# Patient Record
Sex: FEMALE | Race: WHITE | NOT HISPANIC OR LATINO | Employment: FULL TIME | URBAN - METROPOLITAN AREA
[De-identification: names, ages, dates, MRNs, and addresses within clinical notes are randomized per-mention and may not be internally consistent; named-entity substitution may affect disease eponyms.]

---

## 2017-01-27 ENCOUNTER — HOSPITAL ENCOUNTER (OUTPATIENT)
Dept: MAMMOGRAPHY | Facility: CLINIC | Age: 57
Discharge: HOME | End: 2017-01-27
Payer: COMMERCIAL

## 2017-01-27 ENCOUNTER — ALLSCRIPTS OFFICE VISIT (OUTPATIENT)
Dept: OTHER | Facility: OTHER | Age: 57
End: 2017-01-27

## 2017-01-27 ENCOUNTER — TRANSCRIBE ORDERS (OUTPATIENT)
Dept: MAMMOGRAPHY | Facility: CLINIC | Age: 57
End: 2017-01-27

## 2017-01-27 DIAGNOSIS — Z12.31 ENCOUNTER FOR SCREENING MAMMOGRAM FOR MALIGNANT NEOPLASM OF BREAST: ICD-10-CM

## 2017-01-27 PROCEDURE — G0145 SCR C/V CYTO,THINLAYER,RESCR: HCPCS | Performed by: PHYSICIAN ASSISTANT

## 2017-01-27 PROCEDURE — G0202 SCR MAMMO BI INCL CAD: HCPCS

## 2017-01-27 PROCEDURE — 87624 HPV HI-RISK TYP POOLED RSLT: CPT | Performed by: PHYSICIAN ASSISTANT

## 2017-01-28 ENCOUNTER — LAB REQUISITION (OUTPATIENT)
Dept: LAB | Facility: HOSPITAL | Age: 57
End: 2017-01-28
Payer: COMMERCIAL

## 2017-01-28 DIAGNOSIS — Z01.419 ENCOUNTER FOR GYNECOLOGICAL EXAMINATION WITHOUT ABNORMAL FINDING: ICD-10-CM

## 2017-01-31 LAB — HPV RRNA GENITAL QL NAA+PROBE: NORMAL

## 2017-02-01 LAB
LAB AP GYN PRIMARY INTERPRETATION: NORMAL
Lab: NORMAL

## 2017-07-18 DIAGNOSIS — E55.9 VITAMIN D DEFICIENCY: ICD-10-CM

## 2017-07-18 DIAGNOSIS — R73.9 HYPERGLYCEMIA: ICD-10-CM

## 2017-07-18 DIAGNOSIS — E03.9 HYPOTHYROIDISM: ICD-10-CM

## 2017-07-18 DIAGNOSIS — E78.1 PURE HYPERGLYCERIDEMIA: ICD-10-CM

## 2017-07-26 ENCOUNTER — APPOINTMENT (OUTPATIENT)
Dept: LAB | Facility: HOSPITAL | Age: 57
End: 2017-07-26
Payer: COMMERCIAL

## 2017-07-26 ENCOUNTER — TRANSCRIBE ORDERS (OUTPATIENT)
Dept: ADMINISTRATIVE | Facility: HOSPITAL | Age: 57
End: 2017-07-26

## 2017-07-26 DIAGNOSIS — E55.9 VITAMIN D DEFICIENCY: ICD-10-CM

## 2017-07-26 DIAGNOSIS — R73.9 HYPERGLYCEMIA: ICD-10-CM

## 2017-07-26 DIAGNOSIS — E03.9 HYPOTHYROIDISM: ICD-10-CM

## 2017-07-26 DIAGNOSIS — E78.1 PURE HYPERGLYCERIDEMIA: ICD-10-CM

## 2017-07-26 LAB
25(OH)D3 SERPL-MCNC: 41.3 NG/ML (ref 30–100)
ALBUMIN SERPL BCP-MCNC: 3.6 G/DL (ref 3.5–5)
ALP SERPL-CCNC: 56 U/L (ref 46–116)
ALT SERPL W P-5'-P-CCNC: 60 U/L (ref 12–78)
ANION GAP SERPL CALCULATED.3IONS-SCNC: 10 MMOL/L (ref 4–13)
AST SERPL W P-5'-P-CCNC: 22 U/L (ref 5–45)
BASOPHILS # BLD AUTO: 0.03 THOUSANDS/ΜL (ref 0–0.1)
BASOPHILS NFR BLD AUTO: 1 % (ref 0–1)
BILIRUB SERPL-MCNC: 0.5 MG/DL (ref 0.2–1)
BUN SERPL-MCNC: 19 MG/DL (ref 5–25)
CALCIUM SERPL-MCNC: 9.4 MG/DL (ref 8.3–10.1)
CHLORIDE SERPL-SCNC: 102 MMOL/L (ref 100–108)
CHOLEST SERPL-MCNC: 159 MG/DL (ref 50–200)
CO2 SERPL-SCNC: 27 MMOL/L (ref 21–32)
CREAT SERPL-MCNC: 0.98 MG/DL (ref 0.6–1.3)
EOSINOPHIL # BLD AUTO: 0.21 THOUSAND/ΜL (ref 0–0.61)
EOSINOPHIL NFR BLD AUTO: 3 % (ref 0–6)
ERYTHROCYTE [DISTWIDTH] IN BLOOD BY AUTOMATED COUNT: 13.8 % (ref 11.6–15.1)
EST. AVERAGE GLUCOSE BLD GHB EST-MCNC: 114 MG/DL
GFR SERPL CREATININE-BSD FRML MDRD: 64 ML/MIN/1.73SQ M
GLUCOSE P FAST SERPL-MCNC: 101 MG/DL (ref 65–99)
HBA1C MFR BLD: 5.6 % (ref 4.2–6.3)
HCT VFR BLD AUTO: 40.8 % (ref 34.8–46.1)
HDLC SERPL-MCNC: 31 MG/DL (ref 40–60)
HGB BLD-MCNC: 13.8 G/DL (ref 11.5–15.4)
LDLC SERPL CALC-MCNC: 98 MG/DL (ref 0–100)
LYMPHOCYTES # BLD AUTO: 1.86 THOUSANDS/ΜL (ref 0.6–4.47)
LYMPHOCYTES NFR BLD AUTO: 29 % (ref 14–44)
MCH RBC QN AUTO: 29.4 PG (ref 26.8–34.3)
MCHC RBC AUTO-ENTMCNC: 33.8 G/DL (ref 31.4–37.4)
MCV RBC AUTO: 87 FL (ref 82–98)
MONOCYTES # BLD AUTO: 0.57 THOUSAND/ΜL (ref 0.17–1.22)
MONOCYTES NFR BLD AUTO: 9 % (ref 4–12)
NEUTROPHILS # BLD AUTO: 3.82 THOUSANDS/ΜL (ref 1.85–7.62)
NEUTS SEG NFR BLD AUTO: 58 % (ref 43–75)
PLATELET # BLD AUTO: 229 THOUSANDS/UL (ref 149–390)
PMV BLD AUTO: 9.5 FL (ref 8.9–12.7)
POTASSIUM SERPL-SCNC: 3.8 MMOL/L (ref 3.5–5.3)
PROT SERPL-MCNC: 6.9 G/DL (ref 6.4–8.2)
RBC # BLD AUTO: 4.7 MILLION/UL (ref 3.81–5.12)
SODIUM SERPL-SCNC: 139 MMOL/L (ref 136–145)
TRIGL SERPL-MCNC: 150 MG/DL
TSH SERPL DL<=0.05 MIU/L-ACNC: 2.63 UIU/ML (ref 0.36–3.74)
WBC # BLD AUTO: 6.49 THOUSAND/UL (ref 4.31–10.16)

## 2017-07-26 PROCEDURE — 80053 COMPREHEN METABOLIC PANEL: CPT

## 2017-07-26 PROCEDURE — 36415 COLL VENOUS BLD VENIPUNCTURE: CPT

## 2017-07-26 PROCEDURE — 82306 VITAMIN D 25 HYDROXY: CPT

## 2017-07-26 PROCEDURE — 80061 LIPID PANEL: CPT

## 2017-07-26 PROCEDURE — 84443 ASSAY THYROID STIM HORMONE: CPT

## 2017-07-26 PROCEDURE — 85025 COMPLETE CBC W/AUTO DIFF WBC: CPT

## 2017-07-26 PROCEDURE — 83036 HEMOGLOBIN GLYCOSYLATED A1C: CPT

## 2017-07-28 ENCOUNTER — ALLSCRIPTS OFFICE VISIT (OUTPATIENT)
Dept: OTHER | Facility: OTHER | Age: 57
End: 2017-07-28

## 2018-01-13 VITALS
WEIGHT: 195.5 LBS | HEART RATE: 64 BPM | SYSTOLIC BLOOD PRESSURE: 130 MMHG | RESPIRATION RATE: 14 BRPM | HEIGHT: 67 IN | DIASTOLIC BLOOD PRESSURE: 90 MMHG | BODY MASS INDEX: 30.69 KG/M2

## 2018-01-14 VITALS
WEIGHT: 192 LBS | BODY MASS INDEX: 30.13 KG/M2 | SYSTOLIC BLOOD PRESSURE: 124 MMHG | HEART RATE: 64 BPM | RESPIRATION RATE: 14 BRPM | HEIGHT: 67 IN | DIASTOLIC BLOOD PRESSURE: 78 MMHG

## 2018-05-22 ENCOUNTER — TRANSCRIBE ORDERS (OUTPATIENT)
Dept: ADMINISTRATIVE | Facility: HOSPITAL | Age: 58
End: 2018-05-22

## 2018-05-22 DIAGNOSIS — Z12.39 SCREENING BREAST EXAMINATION: Primary | ICD-10-CM

## 2018-05-25 ENCOUNTER — HOSPITAL ENCOUNTER (OUTPATIENT)
Dept: BONE DENSITY | Facility: IMAGING CENTER | Age: 58
Discharge: HOME | End: 2018-05-25
Payer: COMMERCIAL

## 2018-05-25 DIAGNOSIS — Z12.39 SCREENING BREAST EXAMINATION: ICD-10-CM

## 2018-05-25 PROCEDURE — 77067 SCR MAMMO BI INCL CAD: CPT

## 2018-07-16 ENCOUNTER — TELEPHONE (OUTPATIENT)
Dept: FAMILY MEDICINE CLINIC | Facility: CLINIC | Age: 58
End: 2018-07-16

## 2018-07-16 DIAGNOSIS — R73.9 HYPERGLYCEMIA: ICD-10-CM

## 2018-07-16 DIAGNOSIS — E78.1 ESSENTIAL HYPERTRIGLYCERIDEMIA: ICD-10-CM

## 2018-07-16 DIAGNOSIS — E03.9 HYPOTHYROIDISM, UNSPECIFIED TYPE: Primary | ICD-10-CM

## 2018-07-16 DIAGNOSIS — E55.9 VITAMIN D DEFICIENCY: ICD-10-CM

## 2018-07-16 RX ORDER — LEVOTHYROXINE SODIUM 0.07 MG/1
75 TABLET ORAL
Qty: 30 TABLET | Refills: 0 | Status: SHIPPED | OUTPATIENT
Start: 2018-07-16 | End: 2018-08-17 | Stop reason: CLARIF

## 2018-07-16 RX ORDER — LEVOTHYROXINE SODIUM 0.07 MG/1
TABLET ORAL
Refills: 0 | COMMUNITY
Start: 2018-04-16 | End: 2018-07-16 | Stop reason: SDUPTHER

## 2018-07-16 NOTE — TELEPHONE ENCOUNTER
I am sending 30 day refill for her thyroid medication  Please send this message to Samuel Bynum so she can enter her lab orders Elizabeth Floyd is checking her messages today)

## 2018-07-16 NOTE — TELEPHONE ENCOUNTER
Patient made an appointment to see Rm Beal but she needs a lab slip to have her tsh checked and she will need more pills sent to the [facility] until she can get this done

## 2018-07-19 ENCOUNTER — TELEPHONE (OUTPATIENT)
Dept: FAMILY MEDICINE CLINIC | Facility: CLINIC | Age: 58
End: 2018-07-19

## 2018-07-19 DIAGNOSIS — E78.5 HYPERLIPIDEMIA, UNSPECIFIED HYPERLIPIDEMIA TYPE: Primary | ICD-10-CM

## 2018-07-19 RX ORDER — FENOFIBRATE 145 MG/1
1 TABLET, COATED ORAL DAILY
COMMUNITY
Start: 2017-08-07 | End: 2018-07-19 | Stop reason: SDUPTHER

## 2018-07-19 RX ORDER — FENOFIBRATE 145 MG/1
145 TABLET, COATED ORAL DAILY
Qty: 30 TABLET | Refills: 0 | Status: SHIPPED | OUTPATIENT
Start: 2018-07-19 | End: 2018-08-17 | Stop reason: SDUPTHER

## 2018-07-19 NOTE — TELEPHONE ENCOUNTER
Pt pharm LM on the Rx line requesting a refill on the pt's Fenofibrate 145 MG      Pharm call back number: [phone]

## 2018-07-25 RX ORDER — LANOLIN ALCOHOL/MO/W.PET/CERES
1 CREAM (GRAM) TOPICAL DAILY
COMMUNITY

## 2018-07-25 RX ORDER — EPINEPHRINE 0.3 MG/.3ML
INJECTION SUBCUTANEOUS
COMMUNITY
Start: 2012-08-01

## 2018-07-25 RX ORDER — CHLORAL HYDRATE 500 MG
1 CAPSULE ORAL DAILY
COMMUNITY

## 2018-07-26 ENCOUNTER — OFFICE VISIT (OUTPATIENT)
Dept: FAMILY MEDICINE CLINIC | Facility: CLINIC | Age: 58
End: 2018-07-26
Payer: COMMERCIAL

## 2018-07-26 VITALS
BODY MASS INDEX: 30.67 KG/M2 | DIASTOLIC BLOOD PRESSURE: 81 MMHG | SYSTOLIC BLOOD PRESSURE: 122 MMHG | RESPIRATION RATE: 14 BRPM | HEIGHT: 67 IN | HEART RATE: 77 BPM | WEIGHT: 195.4 LBS

## 2018-07-26 DIAGNOSIS — L82.1 SEBORRHEIC KERATOSIS: Primary | ICD-10-CM

## 2018-07-26 DIAGNOSIS — E03.9 HYPOTHYROIDISM, UNSPECIFIED TYPE: ICD-10-CM

## 2018-07-26 DIAGNOSIS — E78.1 ESSENTIAL HYPERTRIGLYCERIDEMIA: ICD-10-CM

## 2018-07-26 PROCEDURE — 99213 OFFICE O/P EST LOW 20 MIN: CPT | Performed by: PHYSICIAN ASSISTANT

## 2018-07-26 NOTE — PROGRESS NOTES
Assessment/Plan:    1  Seborrheic keratosis    - reassurance, schedule for removal here    2  Hypothyroidism, unspecified type    - c/w levothyroxine 75 mcg once daily  - get labs done and will review at appt for skin procedure    3  Essential hypertriglyceridemia    - controlled with Tricor 145 mg qd and diet and exercise  - get labs done and we will discuss at follow up    Colon - FOBT cards given  mammo - due 5/2019  PAP due 1/2019    F/u for skin procedure  F/u as needed    Subjective:   Chief Complaint   Patient presents with    check on moles      Patient ID: Yumiko Farley is a 60 y o  female  Patient here to have two moles reviewed  Both new in the past year, itchy, brown, raised  One on neck and one of back  "get in the way"  Worries as she use to use oil on her skin instead of sun block  Aware she is due for labs  The following portions of the patient's history were reviewed and updated as appropriate: allergies, current medications, past family history, past medical history, past social history, past surgical history and problem list     Past Medical History:   Diagnosis Date    Abnormal liver function test     Last Assessed: 7/26/2012    Diffuse nontoxic goiter     Elevated blood pressure reading     Last Assessed: 1/29/2013    Hematuria     Nonspecific abnormal results of function study of thyroid     Lsat Assessed: 7/26/2012    Seborrhea     Seborrheic keratosis     Last Assessed: 2/6/2013     History reviewed  No pertinent surgical history  Family History   Problem Relation Age of Onset    No Known Problems Mother     Hypertension Father     Diabetes type II Father     Alcohol abuse Neg Hx     Substance Abuse Neg Hx     Mental illness Neg Hx      Social History     Social History    Marital status: /Civil Union     Spouse name: N/A    Number of children: N/A    Years of education: N/A     Occupational History    Not on file       Social History Main Topics    Smoking status: Current Some Day Smoker    Smokeless tobacco: Never Used      Comment: 1/4 of a pack a day    Alcohol use Yes      Comment: social - 1 glass of wine a month    Drug use: No    Sexual activity: Not on file     Other Topics Concern    Not on file     Social History Narrative    Uses safety equipment - seatbelts           Current Outpatient Prescriptions:     Ascorbic Acid (VITAMIN C) 500 MG/5ML LIQD, Take 1 tablet by mouth daily, Disp: , Rfl:     calcium citrate-vitamin D (CITRACAL+D) 315-200 MG-UNIT per tablet, Take 1 tablet by mouth daily, Disp: , Rfl:     Cholecalciferol (VITAMIN D3) 1000 UNIT/SPRAY LIQD, Take 1 capsule by mouth daily, Disp: , Rfl:     EPINEPHrine (EPIPEN 2-MARCIE) 0 3 mg/0 3 mL SOAJ, Inject as directed, Disp: , Rfl:     fenofibrate (TRICOR) 145 mg tablet, Take 1 tablet (145 mg total) by mouth daily, Disp: 30 tablet, Rfl: 0    levothyroxine 75 mcg tablet, Take 1 tablet (75 mcg total) by mouth daily before breakfast, Disp: 30 tablet, Rfl: 0    Multiple Vitamins-Minerals (MULTI-VITAMIN/MINERALS PO), Take 1 tablet by mouth daily, Disp: , Rfl:     Omega-3 Fatty Acids (OMEGA-3 FISH OIL) 1000 MG CAPS, Take 1 capsule by mouth daily, Disp: , Rfl:     vitamin E 400 UNIT/15ML LIQD, Take 1 capsule by mouth daily, Disp: , Rfl:     Review of Systems   Constitutional: Negative  HENT: Negative  Eyes: Negative  Respiratory: Negative  Cardiovascular: Negative  Gastrointestinal: Negative  Endocrine: Negative  Genitourinary: Negative  Musculoskeletal: Negative  Allergic/Immunologic: Negative  Neurological: Negative  Hematological: Negative  Psychiatric/Behavioral: Negative                Objective:    Vitals:    07/26/18 1006   BP: 122/81   BP Location: Right arm   Patient Position: Sitting   Cuff Size: Standard   Pulse: 77   Resp: 14   Weight: 88 6 kg (195 lb 6 4 oz)   Height: 5' 6 75" (1 695 m)        Physical Exam   Constitutional: She is oriented to person, place, and time  She appears well-developed and well-nourished  Cardiovascular: Normal rate, regular rhythm and normal heart sounds  Pulmonary/Chest: Effort normal and breath sounds normal    Neurological: She is alert and oriented to person, place, and time  Skin: Skin is warm  Left side neck 4 mm papular, brown, uniform leathery lesion    Middle of back at bra line 8 mm x 5 mm brown papular uniform leathery lesion   Psychiatric: She has a normal mood and affect

## 2018-07-30 ENCOUNTER — APPOINTMENT (OUTPATIENT)
Dept: LAB | Facility: HOSPITAL | Age: 58
End: 2018-07-30
Payer: COMMERCIAL

## 2018-07-30 DIAGNOSIS — E55.9 VITAMIN D DEFICIENCY: ICD-10-CM

## 2018-07-30 DIAGNOSIS — R73.9 HYPERGLYCEMIA: ICD-10-CM

## 2018-07-30 DIAGNOSIS — E03.9 HYPOTHYROIDISM, UNSPECIFIED TYPE: ICD-10-CM

## 2018-07-30 DIAGNOSIS — E78.1 ESSENTIAL HYPERTRIGLYCERIDEMIA: ICD-10-CM

## 2018-07-30 LAB
25(OH)D3 SERPL-MCNC: 27.4 NG/ML (ref 30–100)
ALBUMIN SERPL BCP-MCNC: 3.8 G/DL (ref 3.5–5)
ALP SERPL-CCNC: 56 U/L (ref 46–116)
ALT SERPL W P-5'-P-CCNC: 64 U/L (ref 12–78)
ANION GAP SERPL CALCULATED.3IONS-SCNC: 4 MMOL/L (ref 4–13)
AST SERPL W P-5'-P-CCNC: 33 U/L (ref 5–45)
BACTERIA UR QL AUTO: ABNORMAL /HPF
BASOPHILS # BLD AUTO: 0.07 THOUSANDS/ΜL (ref 0–0.1)
BASOPHILS NFR BLD AUTO: 1 % (ref 0–1)
BILIRUB SERPL-MCNC: 0.4 MG/DL (ref 0.2–1)
BILIRUB UR QL STRIP: NEGATIVE
BUN SERPL-MCNC: 22 MG/DL (ref 5–25)
CALCIUM SERPL-MCNC: 9.3 MG/DL (ref 8.3–10.1)
CHLORIDE SERPL-SCNC: 103 MMOL/L (ref 100–108)
CLARITY UR: CLEAR
CO2 SERPL-SCNC: 31 MMOL/L (ref 21–32)
COLOR UR: YELLOW
CREAT SERPL-MCNC: 1.06 MG/DL (ref 0.6–1.3)
EOSINOPHIL # BLD AUTO: 0.22 THOUSAND/ΜL (ref 0–0.61)
EOSINOPHIL NFR BLD AUTO: 3 % (ref 0–6)
ERYTHROCYTE [DISTWIDTH] IN BLOOD BY AUTOMATED COUNT: 12.6 % (ref 11.6–15.1)
EST. AVERAGE GLUCOSE BLD GHB EST-MCNC: 117 MG/DL
GFR SERPL CREATININE-BSD FRML MDRD: 58 ML/MIN/1.73SQ M
GLUCOSE P FAST SERPL-MCNC: 109 MG/DL (ref 65–99)
GLUCOSE UR STRIP-MCNC: NEGATIVE MG/DL
HBA1C MFR BLD: 5.7 % (ref 4.2–6.3)
HCT VFR BLD AUTO: 40.9 % (ref 34.8–46.1)
HGB BLD-MCNC: 13.9 G/DL (ref 11.5–15.4)
HGB UR QL STRIP.AUTO: NEGATIVE
IMM GRANULOCYTES # BLD AUTO: 0.04 THOUSAND/UL (ref 0–0.2)
IMM GRANULOCYTES NFR BLD AUTO: 1 % (ref 0–2)
KETONES UR STRIP-MCNC: NEGATIVE MG/DL
LDLC SERPL DIRECT ASSAY-MCNC: 131 MG/DL (ref 0–100)
LEUKOCYTE ESTERASE UR QL STRIP: ABNORMAL
LYMPHOCYTES # BLD AUTO: 2.07 THOUSANDS/ΜL (ref 0.6–4.47)
LYMPHOCYTES NFR BLD AUTO: 32 % (ref 14–44)
MCH RBC QN AUTO: 30.2 PG (ref 26.8–34.3)
MCHC RBC AUTO-ENTMCNC: 34 G/DL (ref 31.4–37.4)
MCV RBC AUTO: 89 FL (ref 82–98)
MONOCYTES # BLD AUTO: 0.54 THOUSAND/ΜL (ref 0.17–1.22)
MONOCYTES NFR BLD AUTO: 8 % (ref 4–12)
NEUTROPHILS # BLD AUTO: 3.6 THOUSANDS/ΜL (ref 1.85–7.62)
NEUTS SEG NFR BLD AUTO: 55 % (ref 43–75)
NITRITE UR QL STRIP: NEGATIVE
NON-SQ EPI CELLS URNS QL MICRO: ABNORMAL /HPF
NRBC BLD AUTO-RTO: 0 /100 WBCS
OTHER STN SPEC: ABNORMAL
PH UR STRIP.AUTO: 6 [PH] (ref 4.5–8)
PLATELET # BLD AUTO: 229 THOUSANDS/UL (ref 149–390)
PMV BLD AUTO: 9.4 FL (ref 8.9–12.7)
POTASSIUM SERPL-SCNC: 4 MMOL/L (ref 3.5–5.3)
PROT SERPL-MCNC: 7 G/DL (ref 6.4–8.2)
PROT UR STRIP-MCNC: NEGATIVE MG/DL
RBC # BLD AUTO: 4.6 MILLION/UL (ref 3.81–5.12)
RBC #/AREA URNS AUTO: ABNORMAL /HPF
SODIUM SERPL-SCNC: 138 MMOL/L (ref 136–145)
SP GR UR STRIP.AUTO: 1.01 (ref 1–1.03)
TSH SERPL DL<=0.05 MIU/L-ACNC: 3.52 UIU/ML (ref 0.36–3.74)
UROBILINOGEN UR QL STRIP.AUTO: 0.2 E.U./DL
WBC # BLD AUTO: 6.54 THOUSAND/UL (ref 4.31–10.16)
WBC #/AREA URNS AUTO: ABNORMAL /HPF

## 2018-07-30 PROCEDURE — 81001 URINALYSIS AUTO W/SCOPE: CPT | Performed by: PHYSICIAN ASSISTANT

## 2018-07-30 PROCEDURE — 83036 HEMOGLOBIN GLYCOSYLATED A1C: CPT

## 2018-07-30 PROCEDURE — 83721 ASSAY OF BLOOD LIPOPROTEIN: CPT

## 2018-07-30 PROCEDURE — 84443 ASSAY THYROID STIM HORMONE: CPT

## 2018-07-30 PROCEDURE — 36415 COLL VENOUS BLD VENIPUNCTURE: CPT

## 2018-07-30 PROCEDURE — 82306 VITAMIN D 25 HYDROXY: CPT

## 2018-07-30 PROCEDURE — 85025 COMPLETE CBC W/AUTO DIFF WBC: CPT

## 2018-07-30 PROCEDURE — 80053 COMPREHEN METABOLIC PANEL: CPT

## 2018-08-17 ENCOUNTER — OFFICE VISIT (OUTPATIENT)
Dept: FAMILY MEDICINE CLINIC | Facility: CLINIC | Age: 58
End: 2018-08-17
Payer: COMMERCIAL

## 2018-08-17 VITALS
HEART RATE: 75 BPM | BODY MASS INDEX: 30.51 KG/M2 | RESPIRATION RATE: 16 BRPM | DIASTOLIC BLOOD PRESSURE: 85 MMHG | HEIGHT: 67 IN | SYSTOLIC BLOOD PRESSURE: 130 MMHG | WEIGHT: 194.4 LBS

## 2018-08-17 DIAGNOSIS — R73.9 HYPERGLYCEMIA: ICD-10-CM

## 2018-08-17 DIAGNOSIS — E03.9 HYPOTHYROIDISM, UNSPECIFIED TYPE: ICD-10-CM

## 2018-08-17 DIAGNOSIS — L98.9 SKIN LESION OF BACK: Primary | ICD-10-CM

## 2018-08-17 DIAGNOSIS — E78.5 HYPERLIPIDEMIA, UNSPECIFIED HYPERLIPIDEMIA TYPE: ICD-10-CM

## 2018-08-17 DIAGNOSIS — E78.1 ESSENTIAL HYPERTRIGLYCERIDEMIA: ICD-10-CM

## 2018-08-17 DIAGNOSIS — E55.9 VITAMIN D DEFICIENCY: ICD-10-CM

## 2018-08-17 PROCEDURE — 88305 TISSUE EXAM BY PATHOLOGIST: CPT | Performed by: PATHOLOGY

## 2018-08-17 PROCEDURE — 11301 SHAVE SKIN LESION 0.6-1.0 CM: CPT | Performed by: PHYSICIAN ASSISTANT

## 2018-08-17 PROCEDURE — 99214 OFFICE O/P EST MOD 30 MIN: CPT | Performed by: PHYSICIAN ASSISTANT

## 2018-08-17 RX ORDER — FENOFIBRATE 145 MG/1
145 TABLET, COATED ORAL DAILY
Qty: 30 TABLET | Refills: 0 | Status: SHIPPED | OUTPATIENT
Start: 2018-08-17 | End: 2018-08-17 | Stop reason: SDUPTHER

## 2018-08-17 RX ORDER — FENOFIBRATE 145 MG/1
145 TABLET, COATED ORAL DAILY
Qty: 90 TABLET | Refills: 3 | Status: SHIPPED | OUTPATIENT
Start: 2018-08-17 | End: 2018-10-26 | Stop reason: SDUPTHER

## 2018-08-17 RX ORDER — LEVOTHYROXINE SODIUM 75 MCG
75 TABLET ORAL DAILY
Qty: 90 TABLET | Refills: 3 | Status: SHIPPED | OUTPATIENT
Start: 2018-08-17 | End: 2019-02-05 | Stop reason: SDUPTHER

## 2018-08-17 NOTE — PROGRESS NOTES
Assessment/Plan:    1  Hypothyroidism, unspecified type    - continue as is with Synthroid 75 mcg, will switch from levothyroxine to synthroid due to recall  Recheck TSh in 6 months  - SYNTHROID 75 MCG tablet; Take 1 tablet (75 mcg total) by mouth daily  Dispense: 90 tablet; Refill: 3    2  Hyperlipidemia, unspecified hyperlipidemia type    - Obtain triglycerides, only LDL run  C/w diet, exercise, Tricor daily  - fenofibrate (TRICOR) 145 mg tablet; Take 1 tablet (145 mg total) by mouth daily  Dispense: 90 tablet; Refill: 3    3  Hyperglycemia    - A1C 5 7% continue with diet and exercise, repeat 1 year    4  Vitamin d    - patient taking 1,000 IU level is 27, increase to 2,000 IU    5  Skin lesion of back    - most likely benign in nature, lesion removed sent to pathology  - Tissue Exam    mammo due 5/2019  Pap due 5/2019  Colon has stool cards    F/u 6 months or sooner if needed    Subjective:   Chief Complaint   Patient presents with    Nevus      Patient ID: Roderick Winters is a 60 y o  female  Patient here to review labs and have a mole on back removed  Mole is at bra line on back, hard to see, cannot tell if it is changing and gets in the way  The following portions of the patient's history were reviewed and updated as appropriate: allergies, current medications, past family history, past medical history, past social history, past surgical history and problem list     Past Medical History:   Diagnosis Date    Abnormal liver function test     Last Assessed: 7/26/2012    Diffuse nontoxic goiter     Elevated blood pressure reading     Last Assessed: 1/29/2013    Hematuria     Nonspecific abnormal results of function study of thyroid     Lsat Assessed: 7/26/2012    Seborrhea     Seborrheic keratosis     Last Assessed: 2/6/2013     History reviewed  No pertinent surgical history    Family History   Problem Relation Age of Onset    No Known Problems Mother     Hypertension Father     Diabetes type II Father     Alcohol abuse Neg Hx     Substance Abuse Neg Hx     Mental illness Neg Hx      Social History     Social History    Marital status: /Civil Union     Spouse name: N/A    Number of children: N/A    Years of education: N/A     Occupational History    Not on file  Social History Main Topics    Smoking status: Current Some Day Smoker    Smokeless tobacco: Never Used      Comment: 1/4 of a pack a day    Alcohol use Yes      Comment: social - 1 glass of wine a month    Drug use: No    Sexual activity: Not on file     Other Topics Concern    Not on file     Social History Narrative    Uses safety equipment - seatbelts           Current Outpatient Prescriptions:     Ascorbic Acid (VITAMIN C) 500 MG/5ML LIQD, Take 1 tablet by mouth daily, Disp: , Rfl:     calcium citrate-vitamin D (CITRACAL+D) 315-200 MG-UNIT per tablet, Take 1 tablet by mouth daily, Disp: , Rfl:     Cholecalciferol (VITAMIN D3) 1000 UNIT/SPRAY LIQD, Take 1 capsule by mouth daily, Disp: , Rfl:     EPINEPHrine (EPIPEN 2-MARCIE) 0 3 mg/0 3 mL SOAJ, Inject as directed, Disp: , Rfl:     fenofibrate (TRICOR) 145 mg tablet, Take 1 tablet (145 mg total) by mouth daily, Disp: 90 tablet, Rfl: 3    Multiple Vitamins-Minerals (MULTI-VITAMIN/MINERALS PO), Take 1 tablet by mouth daily, Disp: , Rfl:     Omega-3 Fatty Acids (OMEGA-3 FISH OIL) 1000 MG CAPS, Take 1 capsule by mouth daily, Disp: , Rfl:     vitamin E 400 UNIT/15ML LIQD, Take 1 capsule by mouth daily, Disp: , Rfl:     Review of Systems          Objective:    Vitals:    08/17/18 0801   BP: 130/85   BP Location: Right arm   Patient Position: Sitting   Cuff Size: Standard   Pulse: 75   Resp: 16   Weight: 88 2 kg (194 lb 6 4 oz)   Height: 5' 7" (1 702 m)        Physical Exam   Constitutional: She is oriented to person, place, and time  She appears well-developed and well-nourished  Cardiovascular: Normal rate, regular rhythm and normal heart sounds      Pulmonary/Chest: Effort normal and breath sounds normal    Neurological: She is alert and oriented to person, place, and time  Skin: Skin is warm  Mid thoracic centrally located brown, velvety papular lesion 8 mm x 4 mm   Psychiatric: She has a normal mood and affect  Judgment normal        Appointment on 07/30/2018   Component Date Value Ref Range Status    LDL Direct 07/30/2018 131* 0 - 100 mg/dl Final    Sodium 07/30/2018 138  136 - 145 mmol/L Final    Potassium 07/30/2018 4 0  3 5 - 5 3 mmol/L Final    Chloride 07/30/2018 103  100 - 108 mmol/L Final    CO2 07/30/2018 31  21 - 32 mmol/L Final    Anion Gap 07/30/2018 4  4 - 13 mmol/L Final    BUN 07/30/2018 22  5 - 25 mg/dL Final    Creatinine 07/30/2018 1 06  0 60 - 1 30 mg/dL Final    Standardized to IDMS reference method    Glucose, Fasting 07/30/2018 109* 65 - 99 mg/dL Final      Specimen collection should occur prior to Sulfasalazine administration due to the potential for falsely depressed results  Specimen collection should occur prior to Sulfapyridine administration due to the potential for falsely elevated results   Calcium 07/30/2018 9 3  8 3 - 10 1 mg/dL Final    AST 07/30/2018 33  5 - 45 U/L Final      Specimen collection should occur prior to Sulfasalazine administration due to the potential for falsely depressed results   ALT 07/30/2018 64  12 - 78 U/L Final      Specimen collection should occur prior to Sulfasalazine administration due to the potential for falsely depressed results       Alkaline Phosphatase 07/30/2018 56  46 - 116 U/L Final    Total Protein 07/30/2018 7 0  6 4 - 8 2 g/dL Final    Albumin 07/30/2018 3 8  3 5 - 5 0 g/dL Final    Total Bilirubin 07/30/2018 0 40  0 20 - 1 00 mg/dL Final    eGFR 07/30/2018 58  ml/min/1 73sq m Final    WBC 07/30/2018 6 54  4 31 - 10 16 Thousand/uL Final    RBC 07/30/2018 4 60  3 81 - 5 12 Million/uL Final    Hemoglobin 07/30/2018 13 9  11 5 - 15 4 g/dL Final    Hematocrit 07/30/2018 40 9 34 8 - 46 1 % Final    MCV 07/30/2018 89  82 - 98 fL Final    MCH 07/30/2018 30 2  26 8 - 34 3 pg Final    MCHC 07/30/2018 34 0  31 4 - 37 4 g/dL Final    RDW 07/30/2018 12 6  11 6 - 15 1 % Final    MPV 07/30/2018 9 4  8 9 - 12 7 fL Final    Platelets 01/05/7558 229  149 - 390 Thousands/uL Final    nRBC 07/30/2018 0  /100 WBCs Final    Neutrophils Relative 07/30/2018 55  43 - 75 % Final    Immat GRANS % 07/30/2018 1  0 - 2 % Final    Lymphocytes Relative 07/30/2018 32  14 - 44 % Final    Monocytes Relative 07/30/2018 8  4 - 12 % Final    Eosinophils Relative 07/30/2018 3  0 - 6 % Final    Basophils Relative 07/30/2018 1  0 - 1 % Final    Neutrophils Absolute 07/30/2018 3 60  1 85 - 7 62 Thousands/µL Final    Immature Grans Absolute 07/30/2018 0 04  0 00 - 0 20 Thousand/uL Final    Lymphocytes Absolute 07/30/2018 2 07  0 60 - 4 47 Thousands/µL Final    Monocytes Absolute 07/30/2018 0 54  0 17 - 1 22 Thousand/µL Final    Eosinophils Absolute 07/30/2018 0 22  0 00 - 0 61 Thousand/µL Final    Basophils Absolute 07/30/2018 0 07  0 00 - 0 10 Thousands/µL Final    TSH 3RD GENERATON 07/30/2018 3 523  0 358 - 3 740 uIU/mL Final    Vit D, 25-Hydroxy 07/30/2018 27 4* 30 0 - 100 0 ng/mL Final    Hemoglobin A1C 07/30/2018 5 7  4 2 - 6 3 % Final    EAG 07/30/2018 117  mg/dl Final   ]  Shave lesion  Date/Time: 8/17/2018 9:18 AM  Performed by: Lenna Bumpers  Authorized by: Lenna Bumpers     Number of Lesions: 1  Lesion 1:     Body area: trunk    Initial size (mm): 8    Final defect size (mm): 10    Malignancy: malignancy unknown      Destruction method: shave removal      Comments:  Consent obtained, area cleaned with rubbing alcohol and injected with 1 cc of lidocaine with epi  Lesion shaved, bleeders cauterized and bandaged applied  Keep lesion clean and dry for 24 hours  Lesion sent for pathology

## 2018-08-20 ENCOUNTER — OFFICE VISIT (OUTPATIENT)
Dept: FAMILY MEDICINE CLINIC | Facility: CLINIC | Age: 58
End: 2018-08-20
Payer: COMMERCIAL

## 2018-08-20 VITALS
WEIGHT: 196 LBS | RESPIRATION RATE: 12 BRPM | SYSTOLIC BLOOD PRESSURE: 122 MMHG | HEART RATE: 84 BPM | BODY MASS INDEX: 30.76 KG/M2 | DIASTOLIC BLOOD PRESSURE: 84 MMHG | HEIGHT: 67 IN

## 2018-08-20 DIAGNOSIS — H60.501 ACUTE OTITIS EXTERNA OF RIGHT EAR, UNSPECIFIED TYPE: Primary | ICD-10-CM

## 2018-08-20 PROCEDURE — 99213 OFFICE O/P EST LOW 20 MIN: CPT | Performed by: NURSE PRACTITIONER

## 2018-08-20 PROCEDURE — 3008F BODY MASS INDEX DOCD: CPT | Performed by: NURSE PRACTITIONER

## 2018-08-20 RX ORDER — NEOMYCIN SULFATE, POLYMYXIN B SULFATE AND HYDROCORTISONE 10; 3.5; 1 MG/ML; MG/ML; [USP'U]/ML
4 SUSPENSION/ DROPS AURICULAR (OTIC) 4 TIMES DAILY
Qty: 10 ML | Refills: 0 | Status: SHIPPED | OUTPATIENT
Start: 2018-08-20 | End: 2019-09-13 | Stop reason: ALTCHOICE

## 2018-08-20 NOTE — PROGRESS NOTES
Assessment/Plan:    1  Acute otitis externa of right ear, unspecified type  Use the ear drops as directed  No qtips in the ear  minimize water in the ear  Call if this is not improving    - neomycin-polymyxin-hydrocortisone (CORTISPORIN) 0 35%-10,000 units/mL-1% otic suspension; Administer 4 drops to the right ear 4 (four) times a day  Dispense: 10 mL; Refill: 0    rto prn     Subjective:      Patient ID: Woo Eng is a 60 y o  female  Here today with complaint of R ear   Has sharp pain in the ear Over a couple days  No sore throat no fevers  Has been doing a lot of swimming  Hearing seems fine  Has been using vinegar and water in the ear  also treid olive oil         OBJECTIVE  Past Medical History:   Diagnosis Date    Abnormal liver function test     Last Assessed: 7/26/2012    Diffuse nontoxic goiter     Elevated blood pressure reading     Last Assessed: 1/29/2013    Hematuria     Nonspecific abnormal results of function study of thyroid     Lsat Assessed: 7/26/2012    Seborrhea     Seborrheic keratosis     Last Assessed: 2/6/2013     @[facility]    Wt Readings from Last 3 Encounters:   08/20/18 88 9 kg (196 lb)   08/17/18 88 2 kg (194 lb 6 4 oz)   07/26/18 88 6 kg (195 lb 6 4 oz)     BP Readings from Last 3 Encounters:   08/20/18 122/84   08/17/18 130/85   07/26/18 122/81     Pulse Readings from Last 3 Encounters:   08/20/18 84   08/17/18 75   07/26/18 77     BMI Readings from Last 3 Encounters:   08/20/18 30 70 kg/m²   08/17/18 30 45 kg/m²   07/26/18 30 83 kg/m²        Physical Exam   Constitutional: She appears well-developed and well-nourished  HENT:   R eac with erythema  TM is clear  L EAC and TM are clear  turbs open no discharge  Pharynx benign  Neck: Normal range of motion  Neck supple

## 2018-10-26 DIAGNOSIS — E78.5 HYPERLIPIDEMIA, UNSPECIFIED HYPERLIPIDEMIA TYPE: ICD-10-CM

## 2018-10-26 RX ORDER — FENOFIBRATE 145 MG/1
TABLET, COATED ORAL
Qty: 30 TABLET | Refills: 6 | Status: SHIPPED | OUTPATIENT
Start: 2018-10-26 | End: 2019-05-18 | Stop reason: SDUPTHER

## 2019-01-25 ENCOUNTER — TELEPHONE (OUTPATIENT)
Dept: FAMILY MEDICINE CLINIC | Facility: CLINIC | Age: 59
End: 2019-01-25

## 2019-01-25 NOTE — TELEPHONE ENCOUNTER
We can but it can be more difficult to manage as the generic form is not as easy to regulate because she will be getting different generics each time based on who is supplying the pharmacy at that time  I thought mail away synthroid was comparable in price no?

## 2019-01-25 NOTE — TELEPHONE ENCOUNTER
Pt SONNY on the Rx line stating the Synthroid is getting too expensive and was wondering if you could send in the Levothyroxine instead      Call back number: [phone]

## 2019-02-04 NOTE — TELEPHONE ENCOUNTER
I finally got a hold of the pt and she stated she is okay with the generic for her and her daughter

## 2019-02-05 DIAGNOSIS — E03.9 HYPOTHYROIDISM, UNSPECIFIED TYPE: ICD-10-CM

## 2019-02-05 RX ORDER — LEVOTHYROXINE SODIUM 0.07 MG/1
75 TABLET ORAL DAILY
Qty: 90 TABLET | Refills: 3 | Status: SHIPPED | OUTPATIENT
Start: 2019-02-05 | End: 2019-07-22 | Stop reason: SDUPTHER

## 2019-05-18 DIAGNOSIS — E78.5 HYPERLIPIDEMIA, UNSPECIFIED HYPERLIPIDEMIA TYPE: ICD-10-CM

## 2019-05-20 RX ORDER — FENOFIBRATE 145 MG/1
TABLET, COATED ORAL
Qty: 30 TABLET | Refills: 6 | Status: SHIPPED | OUTPATIENT
Start: 2019-05-20 | End: 2019-07-22 | Stop reason: SDUPTHER

## 2019-07-18 ENCOUNTER — TELEPHONE (OUTPATIENT)
Dept: FAMILY MEDICINE CLINIC | Facility: CLINIC | Age: 59
End: 2019-07-18

## 2019-07-18 NOTE — TELEPHONE ENCOUNTER
Patient is requesting a mammo order, she would like lab orders to be done before her next appointment and she would like a refill of tricor and synthroid   Ok to wait for rohini on Monday to do this

## 2019-07-22 DIAGNOSIS — Z12.31 VISIT FOR SCREENING MAMMOGRAM: Primary | ICD-10-CM

## 2019-07-22 DIAGNOSIS — R69 TAKING MEDICATION FOR CHRONIC DISEASE: ICD-10-CM

## 2019-07-22 DIAGNOSIS — E78.5 HYPERLIPIDEMIA, UNSPECIFIED HYPERLIPIDEMIA TYPE: ICD-10-CM

## 2019-07-22 DIAGNOSIS — E03.9 HYPOTHYROIDISM, UNSPECIFIED TYPE: ICD-10-CM

## 2019-07-22 DIAGNOSIS — R73.9 HYPERGLYCEMIA: ICD-10-CM

## 2019-07-22 RX ORDER — LEVOTHYROXINE SODIUM 0.07 MG/1
75 TABLET ORAL DAILY
Qty: 90 TABLET | Refills: 3 | Status: SHIPPED | OUTPATIENT
Start: 2019-07-22 | End: 2019-09-13 | Stop reason: DRUGHIGH

## 2019-07-22 RX ORDER — FENOFIBRATE 145 MG/1
145 TABLET, COATED ORAL DAILY
Qty: 30 TABLET | Refills: 6 | Status: SHIPPED | OUTPATIENT
Start: 2019-07-22 | End: 2019-09-13 | Stop reason: ALTCHOICE

## 2019-07-22 NOTE — TELEPHONE ENCOUNTER
I sent all three, but to confirm she wanted the generic levothyroxine due to cost I thought, not synthroid

## 2019-08-26 ENCOUNTER — APPOINTMENT (OUTPATIENT)
Dept: LAB | Facility: HOSPITAL | Age: 59
End: 2019-08-26
Payer: COMMERCIAL

## 2019-08-26 DIAGNOSIS — R69 TAKING MEDICATION FOR CHRONIC DISEASE: ICD-10-CM

## 2019-08-26 DIAGNOSIS — E78.5 HYPERLIPIDEMIA, UNSPECIFIED HYPERLIPIDEMIA TYPE: ICD-10-CM

## 2019-08-26 DIAGNOSIS — R73.9 HYPERGLYCEMIA: ICD-10-CM

## 2019-08-26 DIAGNOSIS — E03.9 HYPOTHYROIDISM, UNSPECIFIED TYPE: ICD-10-CM

## 2019-08-26 LAB
ALBUMIN SERPL BCP-MCNC: 3.8 G/DL (ref 3.5–5)
ALP SERPL-CCNC: 48 U/L (ref 46–116)
ALT SERPL W P-5'-P-CCNC: 39 U/L (ref 12–78)
ANION GAP SERPL CALCULATED.3IONS-SCNC: 9 MMOL/L (ref 4–13)
AST SERPL W P-5'-P-CCNC: 34 U/L (ref 5–45)
BACTERIA UR QL AUTO: ABNORMAL /HPF
BASOPHILS # BLD AUTO: 0.04 THOUSANDS/ΜL (ref 0–0.1)
BASOPHILS NFR BLD AUTO: 1 % (ref 0–1)
BILIRUB SERPL-MCNC: 0.5 MG/DL (ref 0.2–1)
BILIRUB UR QL STRIP: NEGATIVE
BUN SERPL-MCNC: 15 MG/DL (ref 5–25)
CALCIUM SERPL-MCNC: 9.3 MG/DL (ref 8.3–10.1)
CHLORIDE SERPL-SCNC: 103 MMOL/L (ref 100–108)
CHOLEST SERPL-MCNC: 146 MG/DL (ref 50–200)
CLARITY UR: CLEAR
CO2 SERPL-SCNC: 30 MMOL/L (ref 21–32)
COLOR UR: YELLOW
CREAT SERPL-MCNC: 0.91 MG/DL (ref 0.6–1.3)
EOSINOPHIL # BLD AUTO: 0.25 THOUSAND/ΜL (ref 0–0.61)
EOSINOPHIL NFR BLD AUTO: 4 % (ref 0–6)
ERYTHROCYTE [DISTWIDTH] IN BLOOD BY AUTOMATED COUNT: 13 % (ref 11.6–15.1)
EST. AVERAGE GLUCOSE BLD GHB EST-MCNC: 103 MG/DL
GFR SERPL CREATININE-BSD FRML MDRD: 69 ML/MIN/1.73SQ M
GLUCOSE P FAST SERPL-MCNC: 101 MG/DL (ref 65–99)
GLUCOSE UR STRIP-MCNC: NEGATIVE MG/DL
HBA1C MFR BLD: 5.2 % (ref 4.2–6.3)
HCT VFR BLD AUTO: 40 % (ref 34.8–46.1)
HDLC SERPL-MCNC: 30 MG/DL (ref 40–60)
HGB BLD-MCNC: 13.4 G/DL (ref 11.5–15.4)
HGB UR QL STRIP.AUTO: NEGATIVE
IMM GRANULOCYTES # BLD AUTO: 0.02 THOUSAND/UL (ref 0–0.2)
IMM GRANULOCYTES NFR BLD AUTO: 0 % (ref 0–2)
KETONES UR STRIP-MCNC: NEGATIVE MG/DL
LDLC SERPL CALC-MCNC: 97 MG/DL (ref 0–100)
LEUKOCYTE ESTERASE UR QL STRIP: ABNORMAL
LYMPHOCYTES # BLD AUTO: 1.67 THOUSANDS/ΜL (ref 0.6–4.47)
LYMPHOCYTES NFR BLD AUTO: 29 % (ref 14–44)
MCH RBC QN AUTO: 30 PG (ref 26.8–34.3)
MCHC RBC AUTO-ENTMCNC: 33.5 G/DL (ref 31.4–37.4)
MCV RBC AUTO: 90 FL (ref 82–98)
MONOCYTES # BLD AUTO: 0.63 THOUSAND/ΜL (ref 0.17–1.22)
MONOCYTES NFR BLD AUTO: 11 % (ref 4–12)
MUCOUS THREADS UR QL AUTO: ABNORMAL
NEUTROPHILS # BLD AUTO: 3.14 THOUSANDS/ΜL (ref 1.85–7.62)
NEUTS SEG NFR BLD AUTO: 55 % (ref 43–75)
NITRITE UR QL STRIP: NEGATIVE
NON-SQ EPI CELLS URNS QL MICRO: ABNORMAL /HPF
NONHDLC SERPL-MCNC: 116 MG/DL
NRBC BLD AUTO-RTO: 0 /100 WBCS
PH UR STRIP.AUTO: 6 [PH]
PLATELET # BLD AUTO: 209 THOUSANDS/UL (ref 149–390)
PMV BLD AUTO: 9.7 FL (ref 8.9–12.7)
POTASSIUM SERPL-SCNC: 3.7 MMOL/L (ref 3.5–5.3)
PROT SERPL-MCNC: 6.9 G/DL (ref 6.4–8.2)
PROT UR STRIP-MCNC: NEGATIVE MG/DL
RBC # BLD AUTO: 4.47 MILLION/UL (ref 3.81–5.12)
RBC #/AREA URNS AUTO: ABNORMAL /HPF
SODIUM SERPL-SCNC: 142 MMOL/L (ref 136–145)
SP GR UR STRIP.AUTO: 1.01 (ref 1–1.03)
T4 FREE SERPL-MCNC: 1.21 NG/DL (ref 0.76–1.46)
TRIGL SERPL-MCNC: 96 MG/DL
TSH SERPL DL<=0.05 MIU/L-ACNC: 4.13 UIU/ML (ref 0.36–3.74)
UROBILINOGEN UR QL STRIP.AUTO: 0.2 E.U./DL
WBC # BLD AUTO: 5.75 THOUSAND/UL (ref 4.31–10.16)
WBC #/AREA URNS AUTO: ABNORMAL /HPF

## 2019-08-26 PROCEDURE — 83036 HEMOGLOBIN GLYCOSYLATED A1C: CPT | Performed by: PHYSICIAN ASSISTANT

## 2019-08-26 PROCEDURE — 81001 URINALYSIS AUTO W/SCOPE: CPT | Performed by: PHYSICIAN ASSISTANT

## 2019-08-26 PROCEDURE — 84439 ASSAY OF FREE THYROXINE: CPT

## 2019-08-26 PROCEDURE — 80053 COMPREHEN METABOLIC PANEL: CPT

## 2019-08-26 PROCEDURE — 80061 LIPID PANEL: CPT

## 2019-08-26 PROCEDURE — 84443 ASSAY THYROID STIM HORMONE: CPT

## 2019-08-26 PROCEDURE — 36415 COLL VENOUS BLD VENIPUNCTURE: CPT

## 2019-08-26 PROCEDURE — 85025 COMPLETE CBC W/AUTO DIFF WBC: CPT

## 2019-09-11 ENCOUNTER — HOSPITAL ENCOUNTER (OUTPATIENT)
Dept: BONE DENSITY | Facility: IMAGING CENTER | Age: 59
Discharge: HOME | End: 2019-09-11
Payer: COMMERCIAL

## 2019-09-11 ENCOUNTER — APPOINTMENT (OUTPATIENT)
Dept: LAB | Facility: HOSPITAL | Age: 59
End: 2019-09-11
Payer: COMMERCIAL

## 2019-09-11 VITALS — WEIGHT: 185 LBS | BODY MASS INDEX: 29.03 KG/M2 | HEIGHT: 67 IN

## 2019-09-11 DIAGNOSIS — Z12.31 VISIT FOR SCREENING MAMMOGRAM: ICD-10-CM

## 2019-09-11 DIAGNOSIS — Z12.11 SCREENING FOR MALIGNANT NEOPLASM OF COLON: Primary | ICD-10-CM

## 2019-09-11 LAB — HEMOCCULT STL QL IA: NEGATIVE

## 2019-09-11 PROCEDURE — G0328 FECAL BLOOD SCRN IMMUNOASSAY: HCPCS

## 2019-09-11 PROCEDURE — 77067 SCR MAMMO BI INCL CAD: CPT

## 2019-09-11 PROCEDURE — 77063 BREAST TOMOSYNTHESIS BI: CPT

## 2019-09-13 ENCOUNTER — OFFICE VISIT (OUTPATIENT)
Dept: FAMILY MEDICINE CLINIC | Facility: CLINIC | Age: 59
End: 2019-09-13
Payer: COMMERCIAL

## 2019-09-13 VITALS
DIASTOLIC BLOOD PRESSURE: 80 MMHG | WEIGHT: 183.2 LBS | RESPIRATION RATE: 15 BRPM | SYSTOLIC BLOOD PRESSURE: 124 MMHG | HEART RATE: 74 BPM | BODY MASS INDEX: 29.44 KG/M2 | HEIGHT: 66 IN

## 2019-09-13 DIAGNOSIS — E78.1 ESSENTIAL HYPERTRIGLYCERIDEMIA: Primary | ICD-10-CM

## 2019-09-13 DIAGNOSIS — R73.9 HYPERGLYCEMIA: ICD-10-CM

## 2019-09-13 DIAGNOSIS — F17.200 SMOKER: ICD-10-CM

## 2019-09-13 DIAGNOSIS — E03.9 HYPOTHYROIDISM, UNSPECIFIED TYPE: ICD-10-CM

## 2019-09-13 DIAGNOSIS — E55.9 VITAMIN D DEFICIENCY: ICD-10-CM

## 2019-09-13 PROCEDURE — 3008F BODY MASS INDEX DOCD: CPT | Performed by: PHYSICIAN ASSISTANT

## 2019-09-13 PROCEDURE — 99214 OFFICE O/P EST MOD 30 MIN: CPT | Performed by: PHYSICIAN ASSISTANT

## 2019-09-13 RX ORDER — LEVOTHYROXINE SODIUM 88 UG/1
88 TABLET ORAL DAILY
Qty: 90 TABLET | Refills: 1 | Status: SHIPPED | OUTPATIENT
Start: 2019-09-13 | End: 2020-05-07

## 2019-09-13 NOTE — PROGRESS NOTES
Assessment/Plan:    1  Essential hypertriglyceridemia    - Trig 91 on tricor  Patient has lost 20 lbs on diet and exercise  Would like to stop medication and see what her triglycerides are now  Repeat lipids without meds in 3 months  - Lipid panel; Future    2  Hypothyroidism, unspecified type    - TSh 4, patient compliant with medications, will increase to 88 mcg and repeat TSH in 3 months  - levothyroxine 88 mcg tablet; Take 1 tablet (88 mcg total) by mouth daily  Dispense: 90 tablet; Refill: 1  - TSH, 3rd generation with Free T4 reflex; Future    3  Vitamin D deficiency    - not drawn will check in 3 months, c/w supplement  - Vitamin D 25 hydroxy; Future    4  Hyperglycemia    - A1C 5 2, excellent, c/w diet and exercise, repeat 1 year    5  BMI 29 0-29 9,adult    - encouraged patient to work on [employer], exercise  and adequate sleep for weight loss  Follow up if more help is needed    6  Smoker    - CT Screen ordered    mammo due 9/2020  FOBT - done 9/2019 will check for cologard next year  PAP - defer    F/u 6 months or sooner if needed  Subjective:   Chief Complaint   Patient presents with    Review labs      Patient ID: Chani Cornejo is a 60 y o  female  Patient here in follow up  Started a new lifestyle this past spring of diet, exercise, weight loss  Has lost 20+ lbs and feels great  Still smoking, has been since she is 12 but from a pack now down to a few a day  Compliant with Tricor and Levothyroxine   Would like to get off the Tricor with diet and weight loss program        The following portions of the patient's history were reviewed and updated as appropriate: allergies, current medications, past family history, past medical history, past social history, past surgical history and problem list     Past Medical History:   Diagnosis Date    Abnormal liver function test     Last Assessed: 7/26/2012    Diffuse nontoxic goiter     Elevated blood pressure reading     Last Assessed: 1/29/2013    Hematuria     Nonspecific abnormal results of function study of thyroid     Lsat Assessed: 7/26/2012    Seborrhea     Seborrheic keratosis     Last Assessed: 2/6/2013     History reviewed  No pertinent surgical history    Family History   Problem Relation Age of Onset    No Known Problems Mother     Hypertension Father     Diabetes type II Father     No Known Problems Daughter     No Known Problems Maternal Grandmother     No Known Problems Maternal Grandfather     No Known Problems Paternal Grandmother     No Known Problems Paternal Grandfather     No Known Problems Maternal Aunt     No Known Problems Maternal Aunt     No Known Problems Paternal Aunt     No Known Problems Paternal Aunt     Alcohol abuse Neg Hx     Substance Abuse Neg Hx     Mental illness Neg Hx      Social History     Socioeconomic History    Marital status: /Civil Union     Spouse name: Not on file    Number of children: Not on file    Years of education: Not on file    Highest education level: Not on file   Occupational History    Not on file   Social Needs    Financial resource strain: Not on file    Food insecurity:     Worry: Not on file     Inability: Not on file    Transportation needs:     Medical: Not on file     Non-medical: Not on file   Tobacco Use    Smoking status: Current Some Day Smoker    Smokeless tobacco: Never Used    Tobacco comment: 1/4 of a pack a day   Substance and Sexual Activity    Alcohol use: Yes     Comment: social - 1 glass of wine a month    Drug use: No    Sexual activity: Not on file   Lifestyle    Physical activity:     Days per week: Not on file     Minutes per session: Not on file    Stress: Not on file   Relationships    Social connections:     Talks on phone: Not on file     Gets together: Not on file     Attends Tenriism service: Not on file     Active member of club or organization: Not on file     Attends meetings of clubs or organizations: Not on file Relationship status: Not on file    Intimate partner violence:     Fear of current or ex partner: Not on file     Emotionally abused: Not on file     Physically abused: Not on file     Forced sexual activity: Not on file   Other Topics Concern    Not on file   Social History Narrative    Uses safety equipment - seatbelts       Current Outpatient Medications:     Ascorbic Acid (VITAMIN C) 500 MG/5ML LIQD, Take 1 tablet by mouth daily, Disp: , Rfl:     calcium citrate-vitamin D (CITRACAL+D) 315-200 MG-UNIT per tablet, Take 1 tablet by mouth daily, Disp: , Rfl:     Cholecalciferol (VITAMIN D3) 1000 UNIT/SPRAY LIQD, Take 1 capsule by mouth daily, Disp: , Rfl:     EPINEPHrine (EPIPEN 2-MARCIE) 0 3 mg/0 3 mL SOAJ, Inject as directed, Disp: , Rfl:     fenofibrate (TRICOR) 145 mg tablet, Take 1 tablet (145 mg total) by mouth daily, Disp: 30 tablet, Rfl: 6    levothyroxine (SYNTHROID) 75 mcg tablet, Take 1 tablet (75 mcg total) by mouth daily, Disp: 90 tablet, Rfl: 3    Multiple Vitamins-Minerals (MULTI-VITAMIN/MINERALS PO), Take 1 tablet by mouth daily, Disp: , Rfl:     Omega-3 Fatty Acids (OMEGA-3 FISH OIL) 1000 MG CAPS, Take 1 capsule by mouth daily, Disp: , Rfl:     vitamin E 400 UNIT/15ML LIQD, Take 1 capsule by mouth daily, Disp: , Rfl:     Review of Systems   Constitutional: Negative  Eyes: Negative  Respiratory: Negative  Cardiovascular: Negative  Neurological: Negative  Objective:    Vitals:    09/13/19 0921   BP: 124/80   BP Location: Left arm   Patient Position: Sitting   Cuff Size: Standard   Pulse: 74   Resp: 15   Weight: 83 1 kg (183 lb 3 2 oz)   Height: 5' 6 25" (1 683 m)        Physical Exam   Constitutional: She is oriented to person, place, and time  She appears well-developed and well-nourished  Cardiovascular: Normal rate, regular rhythm and normal heart sounds     Pulmonary/Chest: Effort normal and breath sounds normal    Neurological: She is alert and oriented to person, place, and time  Skin: Skin is warm  Psychiatric: She has a normal mood and affect  Orders Only on 09/11/2019   Component Date Value Ref Range Status    OCCULT BLD, FECAL IMMUNOLOGICAL 09/11/2019 Negative  Negative Final   Appointment on 08/26/2019   Component Date Value Ref Range Status    Sodium 08/26/2019 142  136 - 145 mmol/L Final    Potassium 08/26/2019 3 7  3 5 - 5 3 mmol/L Final    Chloride 08/26/2019 103  100 - 108 mmol/L Final    CO2 08/26/2019 30  21 - 32 mmol/L Final    ANION GAP 08/26/2019 9  4 - 13 mmol/L Final    BUN 08/26/2019 15  5 - 25 mg/dL Final    Creatinine 08/26/2019 0 91  0 60 - 1 30 mg/dL Final    Standardized to IDMS reference method    Glucose, Fasting 08/26/2019 101* 65 - 99 mg/dL Final      Specimen collection should occur prior to Sulfasalazine administration due to the potential for falsely depressed results  Specimen collection should occur prior to Sulfapyridine administration due to the potential for falsely elevated results   Calcium 08/26/2019 9 3  8 3 - 10 1 mg/dL Final    AST 08/26/2019 34  5 - 45 U/L Final      Specimen collection should occur prior to Sulfasalazine administration due to the potential for falsely depressed results   ALT 08/26/2019 39  12 - 78 U/L Final      Specimen collection should occur prior to Sulfasalazine administration due to the potential for falsely depressed results       Alkaline Phosphatase 08/26/2019 48  46 - 116 U/L Final    Total Protein 08/26/2019 6 9  6 4 - 8 2 g/dL Final    Albumin 08/26/2019 3 8  3 5 - 5 0 g/dL Final    Total Bilirubin 08/26/2019 0 50  0 20 - 1 00 mg/dL Final    eGFR 08/26/2019 69  ml/min/1 73sq m Final    Cholesterol 08/26/2019 146  50 - 200 mg/dL Final      Cholesterol:       Desirable         <200 mg/dl       Borderline         200-239 mg/dl       High              >239           Triglycerides 08/26/2019 96  <=150 mg/dL Final      Triglyceride:     Normal          <150 mg/dl Borderline High 150-199 mg/dl     High            200-499 mg/dl        Very High       >499 mg/dl    Specimen collection should occur prior to N-Acetylcysteine or Metamizole administration due to the potential for falsely depressed results   HDL, Direct 08/26/2019 30* 40 - 60 mg/dL Final      HDL Cholesterol:       High    >60 mg/dL       Low     <41 mg/dL  Specimen collection should occur prior to Metamizole administration due to the potential for falsley depressed results   LDL Calculated 08/26/2019 97  0 - 100 mg/dL Final      LDL Cholesterol:     Optimal           <100 mg/dl     Near Optimal      100-129 mg/dl     Above Optimal       Borderline High 130-159 mg/dl       High            160-189 mg/dl       Very High       >189 mg/dl         This screening LDL is a calculated result  It does not have the accuracy of the Direct Measured LDL in the monitoring of patients with hyperlipidemia and/or statin therapy  Direct Measure LDL (TTV874) must be ordered separately in these patients      Non-HDL-Chol (CHOL-HDL) 08/26/2019 116  mg/dl Final    WBC 08/26/2019 5 75  4 31 - 10 16 Thousand/uL Final    RBC 08/26/2019 4 47  3 81 - 5 12 Million/uL Final    Hemoglobin 08/26/2019 13 4  11 5 - 15 4 g/dL Final    Hematocrit 08/26/2019 40 0  34 8 - 46 1 % Final    MCV 08/26/2019 90  82 - 98 fL Final    MCH 08/26/2019 30 0  26 8 - 34 3 pg Final    MCHC 08/26/2019 33 5  31 4 - 37 4 g/dL Final    RDW 08/26/2019 13 0  11 6 - 15 1 % Final    MPV 08/26/2019 9 7  8 9 - 12 7 fL Final    Platelets 58/78/9912 209  149 - 390 Thousands/uL Final    nRBC 08/26/2019 0  /100 WBCs Final    Neutrophils Relative 08/26/2019 55  43 - 75 % Final    Immat GRANS % 08/26/2019 0  0 - 2 % Final    Lymphocytes Relative 08/26/2019 29  14 - 44 % Final    Monocytes Relative 08/26/2019 11  4 - 12 % Final    Eosinophils Relative 08/26/2019 4  0 - 6 % Final    Basophils Relative 08/26/2019 1  0 - 1 % Final    Neutrophils Absolute 08/26/2019 3 14  1 85 - 7 62 Thousands/µL Final    Immature Grans Absolute 08/26/2019 0 02  0 00 - 0 20 Thousand/uL Final    Lymphocytes Absolute 08/26/2019 1 67  0 60 - 4 47 Thousands/µL Final    Monocytes Absolute 08/26/2019 0 63  0 17 - 1 22 Thousand/µL Final    Eosinophils Absolute 08/26/2019 0 25  0 00 - 0 61 Thousand/µL Final    Basophils Absolute 08/26/2019 0 04  0 00 - 0 10 Thousands/µL Final    TSH 3RD GENERATON 08/26/2019 4 127* 0 358 - 3 740 uIU/mL Final      The recommended reference ranges for TSH during pregnancy are as follows:   First trimester 0 1 to 2 5 uIU/mL   Second trimester  0 2 to 3 0 uIU/mL   Third trimester 0 3 to 3 0 uIU/m    Note: Normal ranges may not apply to patients who are transgender, non-binary, or whose legal sex, sex at birth, and gender identity differ  Using supplements with high doses of biotin 20 to more than 300 times greater than the adequate daily intake for adults of 30 mcg/day as established by the [place] [employer], can cause falsely depress results   Free T4 08/26/2019 1 21  0 76 - 1 46 ng/dL Final      Specimen collection should occur prior to Sulfasalazine administration due to the potential for falsely elevated results    ]      BMI Counseling: Body mass index is 29 35 kg/m²  The BMI is above normal  Nutrition recommendations include reducing portion sizes, decreasing overall calorie intake and 3-5 servings of fruits/vegetables daily  Exercise recommendations include moderate aerobic physical activity for 150 minutes/week

## 2019-10-04 ENCOUNTER — HOSPITAL ENCOUNTER (OUTPATIENT)
Dept: CT IMAGING | Facility: HOSPITAL | Age: 59
Discharge: HOME | End: 2019-10-04
Payer: COMMERCIAL

## 2019-10-04 DIAGNOSIS — F17.200 SMOKER: ICD-10-CM

## 2019-10-04 PROCEDURE — G0297 LDCT FOR LUNG CA SCREEN: HCPCS

## 2019-10-10 ENCOUNTER — TELEPHONE (OUTPATIENT)
Dept: FAMILY MEDICINE CLINIC | Facility: CLINIC | Age: 59
End: 2019-10-10

## 2019-10-10 NOTE — TELEPHONE ENCOUNTER
----- Message from María Dougherty PA-C sent at 10/9/2019  2:19 PM EDT -----  Please let patient know her CT screen was normal, they recommend repeating this yearly      Left message to call back

## 2020-01-22 ENCOUNTER — APPOINTMENT (OUTPATIENT)
Dept: LAB | Facility: HOSPITAL | Age: 60
End: 2020-01-22
Payer: COMMERCIAL

## 2020-01-22 DIAGNOSIS — E78.1 ESSENTIAL HYPERTRIGLYCERIDEMIA: ICD-10-CM

## 2020-01-22 DIAGNOSIS — E03.9 HYPOTHYROIDISM, UNSPECIFIED TYPE: ICD-10-CM

## 2020-01-22 DIAGNOSIS — E55.9 VITAMIN D DEFICIENCY: ICD-10-CM

## 2020-01-22 LAB
25(OH)D3 SERPL-MCNC: 44.6 NG/ML (ref 30–100)
CHOLEST SERPL-MCNC: 184 MG/DL (ref 50–200)
HDLC SERPL-MCNC: 35 MG/DL
LDLC SERPL CALC-MCNC: 122 MG/DL (ref 0–100)
NONHDLC SERPL-MCNC: 149 MG/DL
TRIGL SERPL-MCNC: 135 MG/DL
TSH SERPL DL<=0.05 MIU/L-ACNC: 3.64 UIU/ML (ref 0.36–3.74)

## 2020-01-22 PROCEDURE — 84443 ASSAY THYROID STIM HORMONE: CPT

## 2020-01-22 PROCEDURE — 36415 COLL VENOUS BLD VENIPUNCTURE: CPT

## 2020-01-22 PROCEDURE — 80061 LIPID PANEL: CPT

## 2020-01-22 PROCEDURE — 82306 VITAMIN D 25 HYDROXY: CPT

## 2020-04-30 DIAGNOSIS — E03.9 HYPOTHYROIDISM, UNSPECIFIED TYPE: ICD-10-CM

## 2020-05-01 RX ORDER — LEVOTHYROXINE SODIUM 0.07 MG/1
TABLET ORAL
Qty: 90 TABLET | Refills: 3 | OUTPATIENT
Start: 2020-05-01

## 2020-05-07 ENCOUNTER — TELEPHONE (OUTPATIENT)
Dept: FAMILY MEDICINE CLINIC | Facility: CLINIC | Age: 60
End: 2020-05-07

## 2020-05-07 DIAGNOSIS — E03.9 HYPOTHYROIDISM, UNSPECIFIED TYPE: ICD-10-CM

## 2020-05-07 RX ORDER — LEVOTHYROXINE SODIUM 88 UG/1
TABLET ORAL
Qty: 90 TABLET | Refills: 1 | Status: SHIPPED | OUTPATIENT
Start: 2020-05-07 | End: 2020-08-20 | Stop reason: SDUPTHER

## 2020-08-20 ENCOUNTER — OFFICE VISIT (OUTPATIENT)
Dept: FAMILY MEDICINE CLINIC | Facility: CLINIC | Age: 60
End: 2020-08-20
Payer: COMMERCIAL

## 2020-08-20 VITALS
TEMPERATURE: 97.6 F | DIASTOLIC BLOOD PRESSURE: 80 MMHG | BODY MASS INDEX: 27.21 KG/M2 | WEIGHT: 173.4 LBS | HEIGHT: 67 IN | RESPIRATION RATE: 16 BRPM | SYSTOLIC BLOOD PRESSURE: 126 MMHG | HEART RATE: 53 BPM

## 2020-08-20 DIAGNOSIS — E78.1 ESSENTIAL HYPERTRIGLYCERIDEMIA: ICD-10-CM

## 2020-08-20 DIAGNOSIS — E03.9 HYPOTHYROIDISM, UNSPECIFIED TYPE: ICD-10-CM

## 2020-08-20 DIAGNOSIS — R69 TAKING MEDICATION FOR CHRONIC DISEASE: ICD-10-CM

## 2020-08-20 DIAGNOSIS — Z12.39 SCREENING FOR MALIGNANT NEOPLASM OF BREAST: Primary | ICD-10-CM

## 2020-08-20 DIAGNOSIS — Z00.00 ANNUAL PHYSICAL EXAM: ICD-10-CM

## 2020-08-20 PROCEDURE — 3725F SCREEN DEPRESSION PERFORMED: CPT | Performed by: PHYSICIAN ASSISTANT

## 2020-08-20 PROCEDURE — 99396 PREV VISIT EST AGE 40-64: CPT | Performed by: PHYSICIAN ASSISTANT

## 2020-08-20 PROCEDURE — 3008F BODY MASS INDEX DOCD: CPT | Performed by: PHYSICIAN ASSISTANT

## 2020-08-20 RX ORDER — BIOTIN 10000 MCG
CAPSULE ORAL DAILY
COMMUNITY

## 2020-08-20 RX ORDER — LEVOTHYROXINE SODIUM 88 UG/1
88 TABLET ORAL DAILY
Qty: 90 TABLET | Refills: 3 | Status: SHIPPED | OUTPATIENT
Start: 2020-08-20 | End: 2021-11-01

## 2020-08-20 NOTE — PROGRESS NOTES
ADULT ANNUAL PHYSICAL  [facility]    NAME: Costa Shaver  AGE: 63 y o  SEX: female  : 1960     DATE: 2020     Assessment and Plan:     Healthy 63year old female    Immunizations and preventive care screenings were discussed with patient today  Appropriate education was printed on patient's after visit summary  Counseling:  Alcohol/drug use: discussed moderation in alcohol intake, the recommendations for healthy alcohol use, and avoidance of illicit drug use  Dental Health: discussed importance of regular tooth brushing, flossing, and dental visits  Injury prevention: discussed safety/seat belts, safety helmets, smoke detectors, carbon dioxide detectors, and smoking near bedding or upholstery  Sexual health: discussed sexually transmitted diseases, partner selection, use of condoms, avoidance of unintended pregnancy, and contraceptive alternatives  · Exercise: the importance of regular exercise/physical activity was discussed  Recommend exercise 3-5 times per week for at least 30 minutes  · Labs ordered for 2021, mammo due 2020, colon due 2020, refusing shingrix     Return in 1 year (on 2021)  Chief Complaint:     Chief Complaint   Patient presents with    Physical Exam      History of Present Illness:     Adult Annual Physical   Patient here for a comprehensive physical exam  The patient reports no problems  Diet and Physical Activity  · Diet/Nutrition: well balanced diet  · Exercise: vigorous cardiovascular exercise, 5-7 times a week on average and 30-60 minutes on average        Depression Screening  PHQ-9 Depression Screening    PHQ-9:    Frequency of the following problems over the past two weeks:       Little interest or pleasure in doing things:  0 - not at all  Feeling down, depressed, or hopeless:  0 - not at all  PHQ-2 Score:  0       General Health  · Sleep: sleeps well and gets 7-8 hours of sleep on average  · Hearing: normal - bilateral   · Vision: goes for regular eye exams and most recent eye exam <1 year ago  · Dental: regular dental visits and brushes teeth twice daily  /GYN Health  · Patient is: postmenopausal       Review of Systems:     Review of Systems   Constitutional: Negative  HENT: Negative  Eyes: Negative  Respiratory: Negative  Cardiovascular: Negative  Gastrointestinal: Negative  Endocrine: Negative  Genitourinary: Negative  Musculoskeletal: Negative  Skin: Negative  Allergic/Immunologic: Negative  Neurological: Negative  Hematological: Negative  Psychiatric/Behavioral: Negative  Past Medical History:     Past Medical History:   Diagnosis Date    Abnormal liver function test     Last Assessed: 7/26/2012    Diffuse nontoxic goiter     Elevated blood pressure reading     Last Assessed: 1/29/2013    Hematuria     Nonspecific abnormal results of function study of thyroid     Lsat Assessed: 7/26/2012    Seborrhea     Seborrheic keratosis     Last Assessed: 2/6/2013      Past Surgical History:     History reviewed  No pertinent surgical history     Social History:        Social History     Socioeconomic History    Marital status: /Civil Union     Spouse name: None    Number of children: None    Years of education: None    Highest education level: None   Occupational History    None   Social Needs    Financial resource strain: None    Food insecurity     Worry: None     Inability: None    Transportation needs     Medical: None     Non-medical: None   Tobacco Use    Smoking status: Current Some Day Smoker     Types: Cigarettes    Smokeless tobacco: Never Used    Tobacco comment: 1/4 of a pack a day   Substance and Sexual Activity    Alcohol use: Yes     Comment: social - 1 glass of wine a month    Drug use: No    Sexual activity: None   Lifestyle    Physical activity     Days per week: None Minutes per session: None    Stress: None   Relationships    Social connections     Talks on phone: None     Gets together: None     Attends Evangelical service: None     Active member of club or organization: None     Attends meetings of clubs or organizations: None     Relationship status: None    Intimate partner violence     Fear of current or ex partner: None     Emotionally abused: None     Physically abused: None     Forced sexual activity: None   Other Topics Concern    None   Social History Narrative    Uses safety equipment - seatbelts      Family History:     Family History   Problem Relation Age of Onset    Heart disease Mother     Heart defect Mother     Hypertension Father     Diabetes type II Father     No Known Problems Daughter     No Known Problems Maternal Grandmother     No Known Problems Maternal Grandfather     No Known Problems Paternal Grandmother     No Known Problems Paternal Grandfather     No Known Problems Maternal Aunt     No Known Problems Maternal Aunt     No Known Problems Paternal Aunt     No Known Problems Paternal Aunt     Alcohol abuse Neg Hx     Substance Abuse Neg Hx     Mental illness Neg Hx       Current Medications:     Current Outpatient Medications   Medication Sig Dispense Refill    Ascorbic Acid (VITAMIN C) 500 MG/5ML LIQD Take 1 tablet by mouth daily      Biotin 10 MG CAPS Take by mouth daily      calcium citrate-vitamin D (CITRACAL+D) 315-200 MG-UNIT per tablet Take 1 tablet by mouth daily      Cholecalciferol (VITAMIN D3) 1000 UNIT/SPRAY LIQD Take 1 capsule by mouth daily      EPINEPHrine (EPIPEN 2-MARCIE) 0 3 mg/0 3 mL SOAJ Inject as directed      levothyroxine 88 mcg tablet Take 1 tablet (88 mcg total) by mouth daily 90 tablet 3    Multiple Vitamins-Minerals (MULTI-VITAMIN/MINERALS PO) Take 1 tablet by mouth daily      Omega-3 Fatty Acids (OMEGA-3 FISH OIL) 1000 MG CAPS Take 1 capsule by mouth daily      vitamin E 400 UNIT/15ML LIQD Take 1 capsule by mouth daily       No current facility-administered medications for this visit  Allergies: Allergies   Allergen Reactions    Bee Venom Edema      Physical Exam:     /80   Pulse (!) 53   Temp 97 6 °F (36 4 °C)   Resp 16   Ht 5' 7" (1 702 m)   Wt 78 7 kg (173 lb 6 4 oz)   BMI 27 16 kg/m²     Physical Exam  Constitutional:       Appearance: Normal appearance  She is well-developed and normal weight  HENT:      Head: Normocephalic and atraumatic  Right Ear: Hearing, tympanic membrane, ear canal and external ear normal       Left Ear: Hearing, tympanic membrane, ear canal and external ear normal       Nose: Nose normal       Mouth/Throat:      Mouth: Mucous membranes are moist       Pharynx: Oropharynx is clear  Uvula midline  Eyes:      Extraocular Movements: Extraocular movements intact  Conjunctiva/sclera: Conjunctivae normal       Pupils: Pupils are equal, round, and reactive to light  Neck:      Musculoskeletal: Normal range of motion and neck supple  Thyroid: No thyromegaly  Cardiovascular:      Rate and Rhythm: Normal rate and regular rhythm  Pulses: Normal pulses  Heart sounds: Normal heart sounds  No murmur  Pulmonary:      Effort: Pulmonary effort is normal       Breath sounds: Normal breath sounds  Abdominal:      General: Bowel sounds are normal  There is no distension  Palpations: Abdomen is soft  There is no mass  Tenderness: There is no abdominal tenderness  Musculoskeletal: Normal range of motion  Lymphadenopathy:      Cervical: No cervical adenopathy  Skin:     General: Skin is warm  Neurological:      General: No focal deficit present  Mental Status: She is alert and oriented to person, place, and time  Cranial Nerves: No cranial nerve deficit  Deep Tendon Reflexes: Reflexes normal    Psychiatric:         Mood and Affect: Mood normal          Behavior: Behavior normal          Thought Content:  Thought content normal          Judgment: Judgment normal         Marisol Rader PA-C  [address]     BMI Counseling: Body mass index is 27 16 kg/m²  The BMI is above normal  Nutrition recommendations include reducing portion sizes, decreasing overall calorie intake and 3-5 servings of fruits/vegetables daily  Exercise recommendations include moderate aerobic physical activity for 150 minutes/week

## 2020-08-20 NOTE — PATIENT INSTRUCTIONS

## 2020-08-20 NOTE — PROGRESS NOTES
Assessment/Plan:            Subjective:   Chief Complaint   Patient presents with    Physical Exam      Patient ID: Claus Braun is a 63 y o  female  HPI    {Common ambulatory SmartLinks:[id number]}    Past Medical History:   Diagnosis Date    Abnormal liver function test     Last Assessed: 7/26/2012    Diffuse nontoxic goiter     Elevated blood pressure reading     Last Assessed: 1/29/2013    Hematuria     Nonspecific abnormal results of function study of thyroid     Lsat Assessed: 7/26/2012    Seborrhea     Seborrheic keratosis     Last Assessed: 2/6/2013     History reviewed  No pertinent surgical history    Family History   Problem Relation Age of Onset    Heart disease Mother     Heart defect Mother     Hypertension Father     Diabetes type II Father     No Known Problems Daughter     No Known Problems Maternal Grandmother     No Known Problems Maternal Grandfather     No Known Problems Paternal Grandmother     No Known Problems Paternal Grandfather     No Known Problems Maternal Aunt     No Known Problems Maternal Aunt     No Known Problems Paternal Aunt     No Known Problems Paternal Aunt     Alcohol abuse Neg Hx     Substance Abuse Neg Hx     Mental illness Neg Hx      Social History     Socioeconomic History    Marital status: /Civil Union     Spouse name: Not on file    Number of children: Not on file    Years of education: Not on file    Highest education level: Not on file   Occupational History    Not on file   Social Needs    Financial resource strain: Not on file    Food insecurity     Worry: Not on file     Inability: Not on file   [employer] needs     Medical: Not on file     Non-medical: Not on file   Tobacco Use    Smoking status: Current Some Day Smoker     Types: Cigarettes    Smokeless tobacco: Never Used    Tobacco comment: 1/4 of a pack a day   Substance and Sexual Activity    Alcohol use: Yes     Comment: social - 1 glass of wine a month    Drug use: No    Sexual activity: Not on file   Lifestyle    Physical activity     Days per week: Not on file     Minutes per session: Not on file    Stress: Not on file   Relationships    Social connections     Talks on phone: Not on file     Gets together: Not on file     Attends Bahai service: Not on file     Active member of club or organization: Not on file     Attends meetings of clubs or organizations: Not on file     Relationship status: Not on file    Intimate partner violence     Fear of current or ex partner: Not on file     Emotionally abused: Not on file     Physically abused: Not on file     Forced sexual activity: Not on file   Other Topics Concern    Not on file   Social History Narrative    Uses safety equipment - seatbelts       Current Outpatient Medications:     Ascorbic Acid (VITAMIN C) 500 MG/5ML LIQD, Take 1 tablet by mouth daily, Disp: , Rfl:     Biotin 10 MG CAPS, Take by mouth daily, Disp: , Rfl:     calcium citrate-vitamin D (CITRACAL+D) 315-200 MG-UNIT per tablet, Take 1 tablet by mouth daily, Disp: , Rfl:     Cholecalciferol (VITAMIN D3) 1000 UNIT/SPRAY LIQD, Take 1 capsule by mouth daily, Disp: , Rfl:     EPINEPHrine (EPIPEN 2-MARCIE) 0 3 mg/0 3 mL SOAJ, Inject as directed, Disp: , Rfl:     levothyroxine 88 mcg tablet, Take 1 tablet (88 mcg total) by mouth daily, Disp: 90 tablet, Rfl: 3    Multiple Vitamins-Minerals (MULTI-VITAMIN/MINERALS PO), Take 1 tablet by mouth daily, Disp: , Rfl:     Omega-3 Fatty Acids (OMEGA-3 FISH OIL) 1000 MG CAPS, Take 1 capsule by mouth daily, Disp: , Rfl:     vitamin E 400 UNIT/15ML LIQD, Take 1 capsule by mouth daily, Disp: , Rfl:     Review of Systems          Objective:    Vitals:    08/20/20 1221 08/20/20 1253   BP: 130/80 126/80   BP Location: Left arm    Patient Position: Sitting    Cuff Size: Standard    Pulse: (!) 53    Resp: 16    Temp: 97 6 °F (36 4 °C)    Weight: 78 7 kg (173 lb 6 4 oz)    Height: 5' 7" (1 702 m)         Physical Exam

## 2020-08-21 ENCOUNTER — TELEPHONE (OUTPATIENT)
Dept: FAMILY MEDICINE CLINIC | Facility: CLINIC | Age: 60
End: 2020-08-21

## 2020-08-31 DIAGNOSIS — Z12.11 ENCOUNTER FOR SCREENING FECAL OCCULT BLOOD TESTING: Primary | ICD-10-CM

## 2021-04-13 DIAGNOSIS — Z23 ENCOUNTER FOR IMMUNIZATION: ICD-10-CM

## 2021-04-21 ENCOUNTER — IMMUNIZATIONS (OUTPATIENT)
Dept: FAMILY MEDICINE CLINIC | Facility: HOSPITAL | Age: 61
End: 2021-04-21

## 2021-04-21 DIAGNOSIS — Z23 ENCOUNTER FOR IMMUNIZATION: Primary | ICD-10-CM

## 2021-04-21 PROCEDURE — 91300 SARS-COV-2 / COVID-19 MRNA VACCINE (PFIZER-BIONTECH) 30 MCG: CPT

## 2021-04-21 PROCEDURE — 0001A SARS-COV-2 / COVID-19 MRNA VACCINE (PFIZER-BIONTECH) 30 MCG: CPT

## 2021-05-17 ENCOUNTER — IMMUNIZATIONS (OUTPATIENT)
Dept: FAMILY MEDICINE CLINIC | Facility: HOSPITAL | Age: 61
End: 2021-05-17

## 2021-05-17 DIAGNOSIS — Z23 ENCOUNTER FOR IMMUNIZATION: Primary | ICD-10-CM

## 2021-05-17 PROCEDURE — 0002A SARS-COV-2 / COVID-19 MRNA VACCINE (PFIZER-BIONTECH) 30 MCG: CPT

## 2021-05-17 PROCEDURE — 91300 SARS-COV-2 / COVID-19 MRNA VACCINE (PFIZER-BIONTECH) 30 MCG: CPT

## 2021-07-28 ENCOUNTER — LAB (OUTPATIENT)
Dept: LAB | Facility: HOSPITAL | Age: 61
End: 2021-07-28
Payer: COMMERCIAL

## 2021-07-28 DIAGNOSIS — R69 TAKING MEDICATION FOR CHRONIC DISEASE: ICD-10-CM

## 2021-07-28 DIAGNOSIS — E78.1 ESSENTIAL HYPERTRIGLYCERIDEMIA: ICD-10-CM

## 2021-07-28 DIAGNOSIS — E03.9 HYPOTHYROIDISM, UNSPECIFIED TYPE: ICD-10-CM

## 2021-07-28 LAB
ALBUMIN SERPL BCP-MCNC: 3.8 G/DL (ref 3.5–5)
ALP SERPL-CCNC: 73 U/L (ref 46–116)
ALT SERPL W P-5'-P-CCNC: 40 U/L (ref 12–78)
ANION GAP SERPL CALCULATED.3IONS-SCNC: 7 MMOL/L (ref 4–13)
AST SERPL W P-5'-P-CCNC: 24 U/L (ref 5–45)
BACTERIA UR QL AUTO: NORMAL /HPF
BASOPHILS # BLD AUTO: 0.05 THOUSANDS/ΜL (ref 0–0.1)
BASOPHILS NFR BLD AUTO: 1 % (ref 0–1)
BILIRUB SERPL-MCNC: 0.57 MG/DL (ref 0.2–1)
BILIRUB UR QL STRIP: NEGATIVE
BUN SERPL-MCNC: 14 MG/DL (ref 5–25)
CALCIUM SERPL-MCNC: 9.4 MG/DL (ref 8.3–10.1)
CHLORIDE SERPL-SCNC: 104 MMOL/L (ref 100–108)
CHOLEST SERPL-MCNC: 181 MG/DL (ref 50–200)
CLARITY UR: CLEAR
CO2 SERPL-SCNC: 26 MMOL/L (ref 21–32)
COLOR UR: YELLOW
CREAT SERPL-MCNC: 0.82 MG/DL (ref 0.6–1.3)
EOSINOPHIL # BLD AUTO: 0.19 THOUSAND/ΜL (ref 0–0.61)
EOSINOPHIL NFR BLD AUTO: 3 % (ref 0–6)
ERYTHROCYTE [DISTWIDTH] IN BLOOD BY AUTOMATED COUNT: 13.2 % (ref 11.6–15.1)
GFR SERPL CREATININE-BSD FRML MDRD: 77 ML/MIN/1.73SQ M
GLUCOSE P FAST SERPL-MCNC: 94 MG/DL (ref 65–99)
GLUCOSE UR STRIP-MCNC: NEGATIVE MG/DL
HCT VFR BLD AUTO: 42.7 % (ref 34.8–46.1)
HDLC SERPL-MCNC: 30 MG/DL
HGB BLD-MCNC: 14.4 G/DL (ref 11.5–15.4)
HGB UR QL STRIP.AUTO: NEGATIVE
HYALINE CASTS #/AREA URNS LPF: NORMAL /LPF
IMM GRANULOCYTES # BLD AUTO: 0.02 THOUSAND/UL (ref 0–0.2)
IMM GRANULOCYTES NFR BLD AUTO: 0 % (ref 0–2)
KETONES UR STRIP-MCNC: NEGATIVE MG/DL
LDLC SERPL CALC-MCNC: 100 MG/DL (ref 0–100)
LEUKOCYTE ESTERASE UR QL STRIP: ABNORMAL
LYMPHOCYTES # BLD AUTO: 1.92 THOUSANDS/ΜL (ref 0.6–4.47)
LYMPHOCYTES NFR BLD AUTO: 30 % (ref 14–44)
MCH RBC QN AUTO: 30.6 PG (ref 26.8–34.3)
MCHC RBC AUTO-ENTMCNC: 33.7 G/DL (ref 31.4–37.4)
MCV RBC AUTO: 91 FL (ref 82–98)
MONOCYTES # BLD AUTO: 0.49 THOUSAND/ΜL (ref 0.17–1.22)
MONOCYTES NFR BLD AUTO: 8 % (ref 4–12)
NEUTROPHILS # BLD AUTO: 3.74 THOUSANDS/ΜL (ref 1.85–7.62)
NEUTS SEG NFR BLD AUTO: 58 % (ref 43–75)
NITRITE UR QL STRIP: NEGATIVE
NON-SQ EPI CELLS URNS QL MICRO: NORMAL /HPF
NRBC BLD AUTO-RTO: 0 /100 WBCS
PH UR STRIP.AUTO: 7 [PH]
PLATELET # BLD AUTO: 217 THOUSANDS/UL (ref 149–390)
PMV BLD AUTO: 9.6 FL (ref 8.9–12.7)
POTASSIUM SERPL-SCNC: 4 MMOL/L (ref 3.5–5.3)
PROT SERPL-MCNC: 7.3 G/DL (ref 6.4–8.2)
PROT UR STRIP-MCNC: NEGATIVE MG/DL
RBC # BLD AUTO: 4.7 MILLION/UL (ref 3.81–5.12)
RBC #/AREA URNS AUTO: NORMAL /HPF
SODIUM SERPL-SCNC: 137 MMOL/L (ref 136–145)
SP GR UR STRIP.AUTO: 1.01 (ref 1–1.03)
TRIGL SERPL-MCNC: 253 MG/DL
TSH SERPL DL<=0.05 MIU/L-ACNC: 1.74 UIU/ML (ref 0.36–3.74)
UROBILINOGEN UR QL STRIP.AUTO: 0.2 E.U./DL
WBC # BLD AUTO: 6.41 THOUSAND/UL (ref 4.31–10.16)
WBC #/AREA URNS AUTO: NORMAL /HPF

## 2021-07-28 PROCEDURE — 81001 URINALYSIS AUTO W/SCOPE: CPT

## 2021-07-28 PROCEDURE — 80053 COMPREHEN METABOLIC PANEL: CPT

## 2021-07-28 PROCEDURE — 85025 COMPLETE CBC W/AUTO DIFF WBC: CPT

## 2021-07-28 PROCEDURE — 36415 COLL VENOUS BLD VENIPUNCTURE: CPT

## 2021-07-28 PROCEDURE — 80061 LIPID PANEL: CPT

## 2021-07-28 PROCEDURE — 84443 ASSAY THYROID STIM HORMONE: CPT

## 2021-08-02 ENCOUNTER — APPOINTMENT (OUTPATIENT)
Dept: LAB | Facility: HOSPITAL | Age: 61
End: 2021-08-02
Payer: COMMERCIAL

## 2021-08-02 DIAGNOSIS — Z12.11 ENCOUNTER FOR SCREENING FECAL OCCULT BLOOD TESTING: ICD-10-CM

## 2021-08-02 LAB — HEMOCCULT STL QL IA: NEGATIVE

## 2021-08-02 PROCEDURE — G0328 FECAL BLOOD SCRN IMMUNOASSAY: HCPCS

## 2021-11-15 ENCOUNTER — OFFICE VISIT (OUTPATIENT)
Dept: FAMILY MEDICINE CLINIC | Facility: CLINIC | Age: 61
End: 2021-11-15
Payer: COMMERCIAL

## 2021-11-15 VITALS
DIASTOLIC BLOOD PRESSURE: 88 MMHG | RESPIRATION RATE: 16 BRPM | HEART RATE: 62 BPM | WEIGHT: 190.8 LBS | BODY MASS INDEX: 30.67 KG/M2 | SYSTOLIC BLOOD PRESSURE: 138 MMHG | HEIGHT: 66 IN

## 2021-11-15 DIAGNOSIS — Z12.31 ENCOUNTER FOR SCREENING MAMMOGRAM FOR MALIGNANT NEOPLASM OF BREAST: ICD-10-CM

## 2021-11-15 DIAGNOSIS — Z23 NEED FOR VACCINATION: ICD-10-CM

## 2021-11-15 DIAGNOSIS — E55.9 VITAMIN D DEFICIENCY: ICD-10-CM

## 2021-11-15 DIAGNOSIS — E03.9 HYPOTHYROIDISM, UNSPECIFIED TYPE: Primary | ICD-10-CM

## 2021-11-15 DIAGNOSIS — Z00.00 ANNUAL PHYSICAL EXAM: ICD-10-CM

## 2021-11-15 DIAGNOSIS — Z78.0 MENOPAUSE: ICD-10-CM

## 2021-11-15 DIAGNOSIS — E78.1 ESSENTIAL HYPERTRIGLYCERIDEMIA: ICD-10-CM

## 2021-11-15 DIAGNOSIS — R73.9 HYPERGLYCEMIA: ICD-10-CM

## 2021-11-15 PROCEDURE — 99214 OFFICE O/P EST MOD 30 MIN: CPT | Performed by: PHYSICIAN ASSISTANT

## 2021-11-15 PROCEDURE — 90471 IMMUNIZATION ADMIN: CPT | Performed by: PHYSICIAN ASSISTANT

## 2021-11-15 PROCEDURE — 90682 RIV4 VACC RECOMBINANT DNA IM: CPT | Performed by: PHYSICIAN ASSISTANT

## 2021-11-15 PROCEDURE — 3725F SCREEN DEPRESSION PERFORMED: CPT | Performed by: PHYSICIAN ASSISTANT

## 2021-11-15 PROCEDURE — 3008F BODY MASS INDEX DOCD: CPT | Performed by: PHYSICIAN ASSISTANT

## 2021-11-15 PROCEDURE — 99396 PREV VISIT EST AGE 40-64: CPT | Performed by: PHYSICIAN ASSISTANT

## 2021-11-15 RX ORDER — FENOFIBRATE 145 MG/1
145 TABLET, COATED ORAL DAILY
Qty: 90 TABLET | Refills: 1 | Status: SHIPPED | OUTPATIENT
Start: 2021-11-15 | End: 2022-05-11

## 2021-11-15 RX ORDER — VITAMIN B COMPLEX
1 CAPSULE ORAL DAILY
COMMUNITY

## 2021-12-06 ENCOUNTER — OFFICE VISIT (OUTPATIENT)
Dept: FAMILY MEDICINE CLINIC | Facility: CLINIC | Age: 61
End: 2021-12-06
Payer: COMMERCIAL

## 2021-12-06 VITALS
DIASTOLIC BLOOD PRESSURE: 80 MMHG | HEIGHT: 67 IN | WEIGHT: 192.2 LBS | BODY MASS INDEX: 30.17 KG/M2 | HEART RATE: 80 BPM | RESPIRATION RATE: 16 BRPM | SYSTOLIC BLOOD PRESSURE: 134 MMHG

## 2021-12-06 DIAGNOSIS — L30.9 PERIORBITAL DERMATITIS: Primary | ICD-10-CM

## 2021-12-06 PROCEDURE — 3008F BODY MASS INDEX DOCD: CPT | Performed by: PHYSICIAN ASSISTANT

## 2021-12-06 PROCEDURE — 1036F TOBACCO NON-USER: CPT | Performed by: PHYSICIAN ASSISTANT

## 2021-12-06 PROCEDURE — 99213 OFFICE O/P EST LOW 20 MIN: CPT | Performed by: PHYSICIAN ASSISTANT

## 2021-12-06 RX ORDER — ALCLOMETASONE DIPROPIONATE 0.5 MG/G
CREAM TOPICAL 2 TIMES DAILY
Qty: 30 G | Refills: 0 | Status: SHIPPED | OUTPATIENT
Start: 2021-12-06

## 2021-12-06 RX ORDER — CETIRIZINE HYDROCHLORIDE 10 MG/1
10 TABLET ORAL DAILY
Qty: 30 TABLET | Refills: 1 | Status: SHIPPED | OUTPATIENT
Start: 2021-12-06

## 2022-01-09 ENCOUNTER — HOSPITAL ENCOUNTER (OUTPATIENT)
Dept: MAMMOGRAPHY | Facility: IMAGING CENTER | Age: 62
Discharge: HOME | End: 2022-01-09

## 2022-01-09 VITALS — HEIGHT: 67 IN | WEIGHT: 191.8 LBS | BODY MASS INDEX: 30.1 KG/M2

## 2022-01-09 DIAGNOSIS — Z12.31 ENCOUNTER FOR SCREENING MAMMOGRAM FOR MALIGNANT NEOPLASM OF BREAST: ICD-10-CM

## 2022-01-09 DIAGNOSIS — Z12.31 VISIT FOR SCREENING MAMMOGRAM: ICD-10-CM

## 2022-01-09 PROCEDURE — 77067 SCR MAMMO BI INCL CAD: CPT

## 2022-01-09 PROCEDURE — 77063 BREAST TOMOSYNTHESIS BI: CPT

## 2022-01-29 DIAGNOSIS — E03.9 HYPOTHYROIDISM, UNSPECIFIED TYPE: ICD-10-CM

## 2022-01-29 RX ORDER — LEVOTHYROXINE SODIUM 88 UG/1
TABLET ORAL
Qty: 90 TABLET | Refills: 0 | Status: SHIPPED | OUTPATIENT
Start: 2022-01-29 | End: 2022-04-25

## 2022-03-28 ENCOUNTER — OFFICE VISIT (OUTPATIENT)
Dept: FAMILY MEDICINE CLINIC | Facility: CLINIC | Age: 62
End: 2022-03-28
Payer: COMMERCIAL

## 2022-03-28 VITALS
RESPIRATION RATE: 16 BRPM | BODY MASS INDEX: 31.06 KG/M2 | DIASTOLIC BLOOD PRESSURE: 86 MMHG | SYSTOLIC BLOOD PRESSURE: 132 MMHG | HEIGHT: 67 IN | HEART RATE: 64 BPM | WEIGHT: 197.9 LBS

## 2022-03-28 DIAGNOSIS — E78.1 ESSENTIAL HYPERTRIGLYCERIDEMIA: ICD-10-CM

## 2022-03-28 DIAGNOSIS — F17.200 SMOKER: ICD-10-CM

## 2022-03-28 DIAGNOSIS — R94.31 ABNORMAL EKG: ICD-10-CM

## 2022-03-28 DIAGNOSIS — R03.0 ELEVATED BLOOD PRESSURE READING: Primary | ICD-10-CM

## 2022-03-28 PROCEDURE — 93000 ELECTROCARDIOGRAM COMPLETE: CPT | Performed by: PHYSICIAN ASSISTANT

## 2022-03-28 PROCEDURE — 99214 OFFICE O/P EST MOD 30 MIN: CPT | Performed by: PHYSICIAN ASSISTANT

## 2022-03-28 PROCEDURE — 3008F BODY MASS INDEX DOCD: CPT | Performed by: PHYSICIAN ASSISTANT

## 2022-03-28 PROCEDURE — 1036F TOBACCO NON-USER: CPT | Performed by: PHYSICIAN ASSISTANT

## 2022-03-28 NOTE — PROGRESS NOTES
Assessment/Plan:    1  Elevated blood pressure reading    - monitor BP at home, 4 times a week, different times of day, bring for our review one month, discussed quitting smoking as this is a major risk factor in CAD, PVD  Gave patient info on vascular screening due to family history  Reviewed labs with patient today also  - POCT ECG    2  Abnormal EKG    - no EKG for comparison, possible old infarction, will do nuclear stress test due to family history, personal history of smoking, elevated triglycerides, oesbity  - NM myocardial perfusion spect (rx stress and/or rest); Future    3  Smoker    - 1/4 pack a day, needs to quit    4  Hypertriglyceridemia    - well controlled on tricor    F/u as needed  F/u 4 weeks to discuss BP      Subjective:   Chief Complaint   Patient presents with    Blood Pressure Check      Patient ID: Pramod Rios is a 60 y o  female  Patient here for BP check  Has had BP checked at dentist twice and both times BP elevated  They have turned patient away because BP too high to do work, 200/110  Patient often has elevated BP at doctors office, dentist tends to be the worse but also feels anxious here  Has not complaints, denies chest pain, sob  Is a smoker  Brother with PVD, Mother triple bypass, Father HTN    Hypertension  This is a new problem  The current episode started in the past 7 days  The problem has been waxing and waning since onset  The problem is controlled  Pertinent negatives include no anxiety, blurred vision, chest pain, headaches, malaise/fatigue, neck pain, orthopnea, palpitations, peripheral edema, PND, shortness of breath or sweats  Agents associated with hypertension include thyroid hormones  Risk factors for coronary artery disease include family history  There are no compliance problems          The following portions of the patient's history were reviewed and updated as appropriate: allergies, current medications, past family history, past medical history, past social history, past surgical history and problem list     Past Medical History:   Diagnosis Date    Abnormal liver function test     Last Assessed: 7/26/2012    Diffuse nontoxic goiter     Elevated blood pressure reading     Last Assessed: 1/29/2013    Hematuria     Nonspecific abnormal results of function study of thyroid     Lsat Assessed: 7/26/2012    Seborrhea     Seborrheic keratosis     Last Assessed: 2/6/2013     History reviewed  No pertinent surgical history    Family History   Problem Relation Age of Onset    Heart disease Mother     Heart defect Mother     Hypertension Father     Diabetes type II Father     No Known Problems Daughter     No Known Problems Maternal Grandmother     No Known Problems Maternal Grandfather     No Known Problems Paternal Grandmother     No Known Problems Paternal Grandfather     No Known Problems Maternal Aunt     No Known Problems Maternal Aunt     No Known Problems Paternal Aunt     No Known Problems Paternal Aunt     Alcohol abuse Neg Hx     Substance Abuse Neg Hx     Mental illness Neg Hx      Social History     Socioeconomic History    Marital status: /Civil Union     Spouse name: Not on file    Number of children: Not on file    Years of education: Not on file    Highest education level: Not on file   Occupational History    Not on file   Tobacco Use    Smoking status: Former Smoker     Types: Cigarettes    Smokeless tobacco: Never Used    Tobacco comment: 1/4 of a pack a day   Vaping Use    Vaping Use: Never used   Substance and Sexual Activity    Alcohol use: Yes     Comment: social - 1 glass of wine a month    Drug use: No    Sexual activity: Not on file   Other Topics Concern    Not on file   Social History Narrative    Uses safety equipment - seatbelts     Social Determinants of Health     Financial Resource Strain: Not on file   Food Insecurity: Not on file   Transportation Needs: Not on file   Physical Activity: Not on file   Stress: Not on file   Social Connections: Not on file   Intimate Partner Violence: Not on file   Housing Stability: Not on file       Current Outpatient Medications:     alclomethasone (ACLOVATE) 0 05 % cream, Apply topically 2 (two) times a day, Disp: 30 g, Rfl: 0    Ascorbic Acid (VITAMIN C) 500 MG/5ML LIQD, Take 1 tablet by mouth daily, Disp: , Rfl:     b complex vitamins capsule, Take 1 capsule by mouth daily, Disp: , Rfl:     Biotin 10 MG CAPS, Take by mouth daily, Disp: , Rfl:     calcium citrate-vitamin D (CITRACAL+D) 315-200 MG-UNIT per tablet, Take 1 tablet by mouth daily, Disp: , Rfl:     cetirizine (ZyrTEC) 10 mg tablet, Take 1 tablet (10 mg total) by mouth daily, Disp: 30 tablet, Rfl: 1    Cholecalciferol (VITAMIN D3) 1000 UNIT/SPRAY LIQD, Take 1 capsule by mouth daily, Disp: , Rfl:     EPINEPHrine (EPIPEN 2-MARCIE) 0 3 mg/0 3 mL SOAJ, Inject as directed, Disp: , Rfl:     fenofibrate (TRICOR) 145 mg tablet, Take 1 tablet (145 mg total) by mouth daily, Disp: 90 tablet, Rfl: 1    levothyroxine 88 mcg tablet, take 1 tablet by mouth once daily, Disp: 90 tablet, Rfl: 0    Multiple Vitamins-Minerals (MULTI-VITAMIN/MINERALS PO), Take 1 tablet by mouth daily, Disp: , Rfl:     Omega-3 Fatty Acids (OMEGA-3 FISH OIL) 1000 MG CAPS, Take 1 capsule by mouth daily, Disp: , Rfl:     vitamin E 400 UNIT/15ML LIQD, Take 1 capsule by mouth daily, Disp: , Rfl:     Review of Systems   Constitutional: Negative for malaise/fatigue  Eyes: Negative for blurred vision  Respiratory: Negative for shortness of breath  Cardiovascular: Negative for chest pain, palpitations, orthopnea and PND  Musculoskeletal: Negative for neck pain  Neurological: Negative for headaches               Objective:    Vitals:    03/28/22 1357   BP: 132/86   BP Location: Left arm   Patient Position: Sitting   Cuff Size: Standard   Pulse: 64   Resp: 16   Weight: 89 8 kg (197 lb 14 4 oz)   Height: 5' 6 75" (1 695 m)        Physical Exam  Constitutional:       Appearance: Normal appearance  HENT:      Head: Normocephalic and atraumatic  Neck:      Vascular: No carotid bruit  Cardiovascular:      Rate and Rhythm: Normal rate and regular rhythm  Pulses: Normal pulses  Heart sounds: Normal heart sounds  Pulmonary:      Effort: Pulmonary effort is normal       Breath sounds: Normal breath sounds  Musculoskeletal:      Cervical back: Normal range of motion and neck supple  Lymphadenopathy:      Cervical: No cervical adenopathy  Neurological:      General: No focal deficit present  Mental Status: She is alert and oriented to person, place, and time  Psychiatric:         Mood and Affect: Mood normal          Behavior: Behavior normal          Thought Content:  Thought content normal          Judgment: Judgment normal

## 2022-03-29 PROBLEM — L82.1 SEBORRHEIC KERATOSIS: Status: RESOLVED | Noted: 2018-07-26 | Resolved: 2022-03-29

## 2022-04-25 DIAGNOSIS — E03.9 HYPOTHYROIDISM, UNSPECIFIED TYPE: ICD-10-CM

## 2022-04-25 RX ORDER — LEVOTHYROXINE SODIUM 88 UG/1
TABLET ORAL
Qty: 90 TABLET | Refills: 0 | Status: SHIPPED | OUTPATIENT
Start: 2022-04-25 | End: 2022-07-30 | Stop reason: SDUPTHER

## 2022-04-26 ENCOUNTER — HOSPITAL ENCOUNTER (OUTPATIENT)
Dept: NON INVASIVE DIAGNOSTICS | Age: 62
Discharge: HOME | End: 2022-04-26
Payer: COMMERCIAL

## 2022-04-26 DIAGNOSIS — R94.31 ABNORMAL EKG: ICD-10-CM

## 2022-04-26 LAB
MAX HR PERCENT: 80 %
NUC STRESS EJECTION FRACTION: 77 %
RATE PRESSURE PRODUCT: NORMAL
SL CV REST NUCLEAR ISOTOPE DOSE: 10.71 MCI
SL CV STRESS NUCLEAR ISOTOPE DOSE: 32.5 MCI
SL CV STRESS STAGE REACHED: 4
STRESS ANGINA INDEX: 0
STRESS BASELINE HR: 55 BPM
STRESS PEAK HR: 126 BPM
STRESS POST ESTIMATED WORKLOAD: 12.9 METS
STRESS POST EXERCISE DUR MIN: 10 MIN
STRESS POST EXERCISE DUR SEC: 54 SEC
STRESS POST PEAK BP: 174 MMHG
STRESS/REST PERFUSION RATIO: 0.91

## 2022-04-26 PROCEDURE — A9502 TC99M TETROFOSMIN: HCPCS

## 2022-04-26 PROCEDURE — G1004 CDSM NDSC: HCPCS

## 2022-04-26 PROCEDURE — 93018 CV STRESS TEST I&R ONLY: CPT | Performed by: INTERNAL MEDICINE

## 2022-04-26 PROCEDURE — 78452 HT MUSCLE IMAGE SPECT MULT: CPT | Performed by: INTERNAL MEDICINE

## 2022-04-26 PROCEDURE — 93017 CV STRESS TEST TRACING ONLY: CPT

## 2022-04-26 PROCEDURE — 78452 HT MUSCLE IMAGE SPECT MULT: CPT

## 2022-04-26 PROCEDURE — 93016 CV STRESS TEST SUPVJ ONLY: CPT | Performed by: INTERNAL MEDICINE

## 2022-04-27 LAB
CHEST PAIN STATEMENT: NORMAL
MAX DIASTOLIC BP: 70 MMHG
MAX HEART RATE: 126 BPM
MAX PREDICTED HEART RATE: 157 BPM
MAX. SYSTOLIC BP: 174 MMHG
PROTOCOL NAME: NORMAL
REASON FOR TERMINATION: NORMAL
TARGET HR FORMULA: NORMAL
TEST INDICATION: NORMAL
TIME IN EXERCISE PHASE: NORMAL

## 2022-07-28 ENCOUNTER — APPOINTMENT (OUTPATIENT)
Dept: LAB | Facility: HOSPITAL | Age: 62
End: 2022-07-28
Payer: COMMERCIAL

## 2022-07-28 DIAGNOSIS — E78.1 ESSENTIAL HYPERTRIGLYCERIDEMIA: ICD-10-CM

## 2022-07-28 LAB
ALBUMIN SERPL BCP-MCNC: 3.9 G/DL (ref 3.5–5)
ALP SERPL-CCNC: 57 U/L (ref 46–116)
ALT SERPL W P-5'-P-CCNC: 58 U/L (ref 12–78)
AST SERPL W P-5'-P-CCNC: 31 U/L (ref 5–45)
BILIRUB DIRECT SERPL-MCNC: 0.08 MG/DL (ref 0–0.2)
BILIRUB SERPL-MCNC: 0.5 MG/DL (ref 0.2–1)
CHOLEST SERPL-MCNC: 173 MG/DL
HDLC SERPL-MCNC: 34 MG/DL
LDLC SERPL CALC-MCNC: 110 MG/DL (ref 0–100)
PROT SERPL-MCNC: 7.2 G/DL (ref 6.4–8.4)
TRIGL SERPL-MCNC: 145 MG/DL

## 2022-07-28 PROCEDURE — 80076 HEPATIC FUNCTION PANEL: CPT

## 2022-07-28 PROCEDURE — 36415 COLL VENOUS BLD VENIPUNCTURE: CPT

## 2022-07-28 PROCEDURE — 80061 LIPID PANEL: CPT

## 2022-07-30 DIAGNOSIS — E78.1 ESSENTIAL HYPERTRIGLYCERIDEMIA: ICD-10-CM

## 2022-07-30 DIAGNOSIS — E03.9 HYPOTHYROIDISM, UNSPECIFIED TYPE: ICD-10-CM

## 2022-08-01 RX ORDER — FENOFIBRATE 145 MG/1
145 TABLET, COATED ORAL DAILY
Qty: 90 TABLET | Refills: 0 | Status: SHIPPED | OUTPATIENT
Start: 2022-08-01

## 2022-08-01 RX ORDER — LEVOTHYROXINE SODIUM 88 UG/1
88 TABLET ORAL DAILY
Qty: 90 TABLET | Refills: 0 | Status: SHIPPED | OUTPATIENT
Start: 2022-08-01

## 2022-08-17 ENCOUNTER — HOSPITAL ENCOUNTER (OUTPATIENT)
Dept: BONE DENSITY | Facility: IMAGING CENTER | Age: 62
Discharge: HOME | End: 2022-08-17
Payer: COMMERCIAL

## 2022-08-17 DIAGNOSIS — Z78.0 MENOPAUSE: ICD-10-CM

## 2022-08-17 PROCEDURE — 77080 DXA BONE DENSITY AXIAL: CPT

## 2022-10-04 ENCOUNTER — OFFICE VISIT (OUTPATIENT)
Dept: FAMILY MEDICINE CLINIC | Facility: CLINIC | Age: 62
End: 2022-10-04
Payer: COMMERCIAL

## 2022-10-04 VITALS
BODY MASS INDEX: 30.26 KG/M2 | DIASTOLIC BLOOD PRESSURE: 80 MMHG | RESPIRATION RATE: 16 BRPM | HEIGHT: 67 IN | SYSTOLIC BLOOD PRESSURE: 130 MMHG | HEART RATE: 65 BPM | WEIGHT: 192.8 LBS

## 2022-10-04 DIAGNOSIS — Z12.11 SCREENING FOR COLON CANCER: ICD-10-CM

## 2022-10-04 DIAGNOSIS — F17.200 SMOKER: ICD-10-CM

## 2022-10-04 DIAGNOSIS — Z83.2 FAMILY HISTORY OF CLOTTING DISORDER: ICD-10-CM

## 2022-10-04 DIAGNOSIS — E78.1 ESSENTIAL HYPERTRIGLYCERIDEMIA: ICD-10-CM

## 2022-10-04 DIAGNOSIS — Z23 IMMUNIZATION DUE: ICD-10-CM

## 2022-10-04 DIAGNOSIS — F41.8 PERFORMANCE ANXIETY: Primary | ICD-10-CM

## 2022-10-04 PROCEDURE — 99214 OFFICE O/P EST MOD 30 MIN: CPT | Performed by: PHYSICIAN ASSISTANT

## 2022-10-04 PROCEDURE — 90471 IMMUNIZATION ADMIN: CPT

## 2022-10-04 PROCEDURE — 90682 RIV4 VACC RECOMBINANT DNA IM: CPT

## 2022-10-04 RX ORDER — PROPRANOLOL HYDROCHLORIDE 40 MG/1
TABLET ORAL
Qty: 20 TABLET | Refills: 0 | Status: SHIPPED | OUTPATIENT
Start: 2022-10-04 | End: 2022-10-26

## 2022-10-04 NOTE — PROGRESS NOTES
Assessment/Plan:    1  Performance anxiety - try inderal 40 mg 30 minutes prior to appt  2  Family history clotting disorder - labs ordered    3  Personal history smoking/hyperlipidemia - VAS screening ordered    Flu shot today, discussed covid booster    F/u as needed  F/u as scheduled    Subjective:   Chief Complaint   Patient presents with    Blood Pressure Check     With pulse       Patient ID: Dereck Cuevas is a 60 y o  female  Patient here because every time she goes to the dentist her BP is elevated  160/100  They cannot proceed with treatment without having her BP controlled  Patient states her anxiety dates back to childhood  Would prefer to drive herself, not take benzo  Also requesting artery screening  History of hyperlipidemia, smoker  Brother has factor v mutation, would like to be screened for this  Hypertension  This is a new problem  The current episode started today  The problem is unchanged  The problem is controlled  Associated symptoms include anxiety  Pertinent negatives include no blurred vision, chest pain, headaches, malaise/fatigue, neck pain, orthopnea, palpitations, peripheral edema, PND, shortness of breath or sweats  Agents associated with hypertension include thyroid hormones  Risk factors for coronary artery disease include obesity  Compliance problems include exercise          The following portions of the patient's history were reviewed and updated as appropriate: allergies, current medications, past family history, past medical history, past social history, past surgical history and problem list     Past Medical History:   Diagnosis Date    Abnormal liver function test     Last Assessed: 7/26/2012    Diffuse nontoxic goiter     Elevated blood pressure reading     Last Assessed: 1/29/2013    Hematuria     Nonspecific abnormal results of function study of thyroid     Lsat Assessed: 7/26/2012    Seborrhea     Seborrheic keratosis     Last Assessed: 2/6/2013 History reviewed  No pertinent surgical history    Family History   Problem Relation Age of Onset    Heart disease Mother     Heart defect Mother     Hypertension Father     Diabetes type II Father     No Known Problems Daughter     No Known Problems Maternal Grandmother     No Known Problems Maternal Grandfather     No Known Problems Paternal Grandmother     No Known Problems Paternal Grandfather     No Known Problems Maternal Aunt     No Known Problems Maternal Aunt     No Known Problems Paternal Aunt     No Known Problems Paternal Aunt     Alcohol abuse Neg Hx     Substance Abuse Neg Hx     Mental illness Neg Hx      Social History     Socioeconomic History    Marital status: /Civil Union     Spouse name: Not on file    Number of children: Not on file    Years of education: Not on file    Highest education level: Not on file   Occupational History    Not on file   Tobacco Use    Smoking status: Former Smoker     Types: Cigarettes    Smokeless tobacco: Never Used    Tobacco comment: 1/4 of a pack a day   Vaping Use    Vaping Use: Never used   Substance and Sexual Activity    Alcohol use: Yes     Comment: social - 1 glass of wine a month    Drug use: No    Sexual activity: Not on file   Other Topics Concern    Not on file   Social History Narrative    Uses safety equipment - seatbelts     Social Determinants of Health     Financial Resource Strain: Not on file   Food Insecurity: Not on file   Transportation Needs: Not on file   Physical Activity: Not on file   Stress: Not on file   Social Connections: Not on file   Intimate Partner Violence: Not on file   Housing Stability: Not on file       Current Outpatient Medications:     Ascorbic Acid (VITAMIN C) 500 MG/5ML LIQD, Take 1 tablet by mouth daily, Disp: , Rfl:     b complex vitamins capsule, Take 1 capsule by mouth daily, Disp: , Rfl:     Biotin 10 MG CAPS, Take by mouth daily, Disp: , Rfl:     calcium citrate-vitamin D (CITRACAL+D) 315-200 MG-UNIT per tablet, Take 1 tablet by mouth daily, Disp: , Rfl:     Cholecalciferol (VITAMIN D3) 1000 UNIT/SPRAY LIQD, Take 1 capsule by mouth daily, Disp: , Rfl:     EPINEPHrine (EPIPEN) 0 3 mg/0 3 mL SOAJ, Inject as directed, Disp: , Rfl:     fenofibrate (TRICOR) 145 mg tablet, Take 1 tablet (145 mg total) by mouth daily, Disp: 90 tablet, Rfl: 0    levothyroxine 88 mcg tablet, Take 1 tablet (88 mcg total) by mouth daily, Disp: 90 tablet, Rfl: 0    Multiple Vitamins-Minerals (MULTI-VITAMIN/MINERALS PO), Take 1 tablet by mouth daily, Disp: , Rfl:     Omega-3 Fatty Acids (OMEGA-3 FISH OIL) 1000 MG CAPS, Take 1 capsule by mouth daily, Disp: , Rfl:     vitamin E 400 UNIT/15ML LIQD, Take 1 capsule by mouth daily, Disp: , Rfl:     Review of Systems   Constitutional: Negative for malaise/fatigue  Eyes: Negative for blurred vision  Respiratory: Negative for shortness of breath  Cardiovascular: Negative for chest pain, palpitations, orthopnea and PND  Musculoskeletal: Negative for neck pain  Neurological: Negative for headaches  Objective:    Vitals:    10/04/22 1210   BP: 130/90   Pulse: 65   Resp: 16   Weight: 87 5 kg (192 lb 12 8 oz)   Height: 5' 7" (1 702 m)     BP Readings from Last 3 Encounters:   10/04/22 130/80   03/28/22 132/86   12/06/21 134/80        Physical Exam  Constitutional:       Appearance: Normal appearance  HENT:      Head: Normocephalic and atraumatic  Cardiovascular:      Rate and Rhythm: Normal rate and regular rhythm  Pulses: Normal pulses  Heart sounds: Normal heart sounds  Pulmonary:      Effort: Pulmonary effort is normal       Breath sounds: Normal breath sounds  Musculoskeletal:         General: Normal range of motion  Cervical back: Normal range of motion and neck supple  Skin:     General: Skin is warm  Neurological:      General: No focal deficit present        Mental Status: She is alert and oriented to person, place, and time  Psychiatric:         Mood and Affect: Mood normal          Behavior: Behavior normal          Thought Content:  Thought content normal          Judgment: Judgment normal

## 2022-10-26 DIAGNOSIS — F41.8 PERFORMANCE ANXIETY: ICD-10-CM

## 2022-10-26 RX ORDER — PROPRANOLOL HYDROCHLORIDE 40 MG/1
TABLET ORAL
Qty: 20 TABLET | Refills: 0 | Status: SHIPPED | OUTPATIENT
Start: 2022-10-26

## 2022-10-30 DIAGNOSIS — E78.1 ESSENTIAL HYPERTRIGLYCERIDEMIA: ICD-10-CM

## 2022-10-30 DIAGNOSIS — E03.9 HYPOTHYROIDISM, UNSPECIFIED TYPE: ICD-10-CM

## 2022-10-31 RX ORDER — LEVOTHYROXINE SODIUM 88 UG/1
TABLET ORAL
Qty: 90 TABLET | Refills: 0 | Status: SHIPPED | OUTPATIENT
Start: 2022-10-31

## 2022-10-31 RX ORDER — FENOFIBRATE 145 MG/1
TABLET, COATED ORAL
Qty: 90 TABLET | Refills: 0 | Status: SHIPPED | OUTPATIENT
Start: 2022-10-31

## 2022-11-11 ENCOUNTER — HOSPITAL ENCOUNTER (OUTPATIENT)
Dept: NON INVASIVE DIAGNOSTICS | Facility: HOSPITAL | Age: 62
Discharge: HOME | End: 2022-11-11

## 2022-11-11 ENCOUNTER — APPOINTMENT (OUTPATIENT)
Dept: LAB | Facility: HOSPITAL | Age: 62
End: 2022-11-11

## 2022-11-11 DIAGNOSIS — F17.200 SMOKER: ICD-10-CM

## 2022-11-11 DIAGNOSIS — Z83.2 FAMILY HISTORY OF CLOTTING DISORDER: ICD-10-CM

## 2022-11-11 DIAGNOSIS — E78.1 ESSENTIAL HYPERTRIGLYCERIDEMIA: ICD-10-CM

## 2022-11-17 LAB — F5 GENE MUT ANL BLD/T: NORMAL

## 2022-11-20 DIAGNOSIS — F41.8 PERFORMANCE ANXIETY: ICD-10-CM

## 2022-11-20 RX ORDER — PROPRANOLOL HYDROCHLORIDE 40 MG/1
TABLET ORAL
Qty: 20 TABLET | Refills: 0 | Status: SHIPPED | OUTPATIENT
Start: 2022-11-20

## 2023-01-27 DIAGNOSIS — E78.1 ESSENTIAL HYPERTRIGLYCERIDEMIA: ICD-10-CM

## 2023-01-27 DIAGNOSIS — E03.9 HYPOTHYROIDISM, UNSPECIFIED TYPE: ICD-10-CM

## 2023-01-27 RX ORDER — LEVOTHYROXINE SODIUM 88 UG/1
TABLET ORAL
Qty: 90 TABLET | Refills: 0 | Status: SHIPPED | OUTPATIENT
Start: 2023-01-27

## 2023-01-27 RX ORDER — FENOFIBRATE 145 MG/1
TABLET, COATED ORAL
Qty: 90 TABLET | Refills: 0 | Status: SHIPPED | OUTPATIENT
Start: 2023-01-27

## 2023-05-01 DIAGNOSIS — E78.1 ESSENTIAL HYPERTRIGLYCERIDEMIA: ICD-10-CM

## 2023-05-01 DIAGNOSIS — E03.9 HYPOTHYROIDISM, UNSPECIFIED TYPE: ICD-10-CM

## 2023-05-02 RX ORDER — LEVOTHYROXINE SODIUM 88 UG/1
TABLET ORAL
Qty: 90 TABLET | Refills: 0 | Status: SHIPPED | OUTPATIENT
Start: 2023-05-02

## 2023-05-02 RX ORDER — FENOFIBRATE 145 MG/1
TABLET, COATED ORAL
Qty: 90 TABLET | Refills: 0 | Status: SHIPPED | OUTPATIENT
Start: 2023-05-02

## 2023-06-21 DIAGNOSIS — R69 TAKING MEDICATION FOR CHRONIC DISEASE: ICD-10-CM

## 2023-06-21 DIAGNOSIS — E55.9 VITAMIN D DEFICIENCY: Primary | ICD-10-CM

## 2023-06-21 DIAGNOSIS — E03.9 HYPOTHYROIDISM, UNSPECIFIED TYPE: ICD-10-CM

## 2023-06-21 DIAGNOSIS — E78.1 ESSENTIAL HYPERTRIGLYCERIDEMIA: ICD-10-CM

## 2023-06-21 DIAGNOSIS — R73.9 HYPERGLYCEMIA: ICD-10-CM

## 2023-07-08 ENCOUNTER — APPOINTMENT (OUTPATIENT)
Dept: LAB | Facility: HOSPITAL | Age: 63
End: 2023-07-08
Payer: COMMERCIAL

## 2023-07-08 DIAGNOSIS — R69 TAKING MEDICATION FOR CHRONIC DISEASE: ICD-10-CM

## 2023-07-08 DIAGNOSIS — R73.9 HYPERGLYCEMIA: ICD-10-CM

## 2023-07-08 DIAGNOSIS — E78.1 ESSENTIAL HYPERTRIGLYCERIDEMIA: ICD-10-CM

## 2023-07-08 DIAGNOSIS — E55.9 VITAMIN D DEFICIENCY: ICD-10-CM

## 2023-07-08 LAB
25(OH)D3 SERPL-MCNC: 49.2 NG/ML (ref 30–100)
ALBUMIN SERPL BCP-MCNC: 4.3 G/DL (ref 3.5–5)
ALP SERPL-CCNC: 43 U/L (ref 34–104)
ALT SERPL W P-5'-P-CCNC: 29 U/L (ref 7–52)
ANION GAP SERPL CALCULATED.3IONS-SCNC: 4 MMOL/L
AST SERPL W P-5'-P-CCNC: 23 U/L (ref 13–39)
BACTERIA UR QL AUTO: ABNORMAL /HPF
BASOPHILS # BLD AUTO: 0.03 THOUSANDS/ÂΜL (ref 0–0.1)
BASOPHILS NFR BLD AUTO: 1 % (ref 0–1)
BILIRUB SERPL-MCNC: 0.54 MG/DL (ref 0.2–1)
BILIRUB UR QL STRIP: NEGATIVE
BUN SERPL-MCNC: 19 MG/DL (ref 5–25)
CALCIUM SERPL-MCNC: 9.8 MG/DL (ref 8.4–10.2)
CHLORIDE SERPL-SCNC: 106 MMOL/L (ref 96–108)
CHOLEST SERPL-MCNC: 158 MG/DL
CLARITY UR: CLEAR
CO2 SERPL-SCNC: 29 MMOL/L (ref 21–32)
COLOR UR: YELLOW
CREAT SERPL-MCNC: 0.91 MG/DL (ref 0.6–1.3)
EOSINOPHIL # BLD AUTO: 0.21 THOUSAND/ÂΜL (ref 0–0.61)
EOSINOPHIL NFR BLD AUTO: 3 % (ref 0–6)
ERYTHROCYTE [DISTWIDTH] IN BLOOD BY AUTOMATED COUNT: 13.1 % (ref 11.6–15.1)
GFR SERPL CREATININE-BSD FRML MDRD: 66 ML/MIN/1.73SQ M
GLUCOSE P FAST SERPL-MCNC: 97 MG/DL (ref 65–99)
GLUCOSE UR STRIP-MCNC: NEGATIVE MG/DL
HCT VFR BLD AUTO: 40 % (ref 34.8–46.1)
HDLC SERPL-MCNC: 33 MG/DL
HGB BLD-MCNC: 13.6 G/DL (ref 11.5–15.4)
HGB UR QL STRIP.AUTO: NEGATIVE
IMM GRANULOCYTES # BLD AUTO: 0.03 THOUSAND/UL (ref 0–0.2)
IMM GRANULOCYTES NFR BLD AUTO: 1 % (ref 0–2)
KETONES UR STRIP-MCNC: NEGATIVE MG/DL
LDLC SERPL CALC-MCNC: 95 MG/DL (ref 0–100)
LEUKOCYTE ESTERASE UR QL STRIP: ABNORMAL
LYMPHOCYTES # BLD AUTO: 2.08 THOUSANDS/ÂΜL (ref 0.6–4.47)
LYMPHOCYTES NFR BLD AUTO: 33 % (ref 14–44)
MCH RBC QN AUTO: 29.8 PG (ref 26.8–34.3)
MCHC RBC AUTO-ENTMCNC: 34 G/DL (ref 31.4–37.4)
MCV RBC AUTO: 88 FL (ref 82–98)
MONOCYTES # BLD AUTO: 0.51 THOUSAND/ÂΜL (ref 0.17–1.22)
MONOCYTES NFR BLD AUTO: 8 % (ref 4–12)
NEUTROPHILS # BLD AUTO: 3.48 THOUSANDS/ÂΜL (ref 1.85–7.62)
NEUTS SEG NFR BLD AUTO: 54 % (ref 43–75)
NITRITE UR QL STRIP: NEGATIVE
NON-SQ EPI CELLS URNS QL MICRO: ABNORMAL /HPF
NRBC BLD AUTO-RTO: 0 /100 WBCS
PH UR STRIP.AUTO: 6 [PH]
PLATELET # BLD AUTO: 228 THOUSANDS/UL (ref 149–390)
PMV BLD AUTO: 9.2 FL (ref 8.9–12.7)
POTASSIUM SERPL-SCNC: 4.3 MMOL/L (ref 3.5–5.3)
PROT SERPL-MCNC: 7 G/DL (ref 6.4–8.4)
PROT UR STRIP-MCNC: NEGATIVE MG/DL
RBC # BLD AUTO: 4.57 MILLION/UL (ref 3.81–5.12)
RBC #/AREA URNS AUTO: ABNORMAL /HPF
SODIUM SERPL-SCNC: 139 MMOL/L (ref 135–147)
SP GR UR STRIP.AUTO: 1.01 (ref 1–1.03)
TRIGL SERPL-MCNC: 151 MG/DL
TSH SERPL DL<=0.05 MIU/L-ACNC: 3.71 UIU/ML (ref 0.45–4.5)
UROBILINOGEN UR STRIP-ACNC: <2 MG/DL
WBC # BLD AUTO: 6.34 THOUSAND/UL (ref 4.31–10.16)
WBC #/AREA URNS AUTO: ABNORMAL /HPF

## 2023-07-08 PROCEDURE — 83036 HEMOGLOBIN GLYCOSYLATED A1C: CPT

## 2023-07-08 PROCEDURE — 80061 LIPID PANEL: CPT

## 2023-07-08 PROCEDURE — 84443 ASSAY THYROID STIM HORMONE: CPT

## 2023-07-08 PROCEDURE — 80053 COMPREHEN METABOLIC PANEL: CPT

## 2023-07-08 PROCEDURE — 81001 URINALYSIS AUTO W/SCOPE: CPT

## 2023-07-08 PROCEDURE — 82306 VITAMIN D 25 HYDROXY: CPT

## 2023-07-08 PROCEDURE — 36415 COLL VENOUS BLD VENIPUNCTURE: CPT

## 2023-07-08 PROCEDURE — 85025 COMPLETE CBC W/AUTO DIFF WBC: CPT

## 2023-07-11 LAB
EST. AVERAGE GLUCOSE BLD GHB EST-MCNC: 117 MG/DL
HBA1C MFR BLD: 5.7 %

## 2023-07-27 DIAGNOSIS — E78.1 ESSENTIAL HYPERTRIGLYCERIDEMIA: ICD-10-CM

## 2023-07-27 DIAGNOSIS — E03.9 HYPOTHYROIDISM, UNSPECIFIED TYPE: ICD-10-CM

## 2023-07-27 RX ORDER — FENOFIBRATE 145 MG/1
TABLET, COATED ORAL
Qty: 90 TABLET | Refills: 0 | Status: SHIPPED | OUTPATIENT
Start: 2023-07-27 | End: 2023-07-28 | Stop reason: SDUPTHER

## 2023-07-27 RX ORDER — LEVOTHYROXINE SODIUM 88 UG/1
TABLET ORAL
Qty: 90 TABLET | Refills: 0 | Status: SHIPPED | OUTPATIENT
Start: 2023-07-27 | End: 2023-07-28 | Stop reason: SDUPTHER

## 2023-07-28 ENCOUNTER — OFFICE VISIT (OUTPATIENT)
Dept: FAMILY MEDICINE CLINIC | Facility: CLINIC | Age: 63
End: 2023-07-28
Payer: COMMERCIAL

## 2023-07-28 VITALS
BODY MASS INDEX: 29.95 KG/M2 | SYSTOLIC BLOOD PRESSURE: 128 MMHG | OXYGEN SATURATION: 96 % | WEIGHT: 190.8 LBS | HEIGHT: 67 IN | HEART RATE: 62 BPM | DIASTOLIC BLOOD PRESSURE: 82 MMHG | TEMPERATURE: 97.9 F | RESPIRATION RATE: 16 BRPM

## 2023-07-28 DIAGNOSIS — Z00.00 ANNUAL PHYSICAL EXAM: Primary | ICD-10-CM

## 2023-07-28 DIAGNOSIS — T63.441D ANAPHYLACTIC REACTION TO BEE STING, ACCIDENTAL OR UNINTENTIONAL, SUBSEQUENT ENCOUNTER: ICD-10-CM

## 2023-07-28 DIAGNOSIS — E03.9 HYPOTHYROIDISM, UNSPECIFIED TYPE: ICD-10-CM

## 2023-07-28 DIAGNOSIS — E78.1 ESSENTIAL HYPERTRIGLYCERIDEMIA: ICD-10-CM

## 2023-07-28 DIAGNOSIS — Z12.11 SCREENING FOR COLON CANCER: ICD-10-CM

## 2023-07-28 PROBLEM — T63.441A BEE STING-INDUCED ANAPHYLAXIS: Status: ACTIVE | Noted: 2023-07-28

## 2023-07-28 PROBLEM — T78.2XXA BEE STING-INDUCED ANAPHYLAXIS: Status: ACTIVE | Noted: 2023-07-28

## 2023-07-28 PROCEDURE — 99396 PREV VISIT EST AGE 40-64: CPT | Performed by: PHYSICIAN ASSISTANT

## 2023-07-28 RX ORDER — FENOFIBRATE 145 MG/1
145 TABLET, COATED ORAL DAILY
Qty: 90 TABLET | Refills: 3 | Status: SHIPPED | OUTPATIENT
Start: 2023-07-28

## 2023-07-28 RX ORDER — EPINEPHRINE 0.3 MG/.3ML
0.3 INJECTION SUBCUTANEOUS ONCE
Qty: 0.6 ML | Refills: 3 | Status: SHIPPED | OUTPATIENT
Start: 2023-07-28 | End: 2023-07-28

## 2023-07-28 RX ORDER — LEVOTHYROXINE SODIUM 88 UG/1
88 TABLET ORAL DAILY
Qty: 90 TABLET | Refills: 3 | Status: SHIPPED | OUTPATIENT
Start: 2023-07-28

## 2023-07-28 NOTE — PROGRESS NOTES
ADULT ANNUAL PHYSICAL  [id number] [facility]    NAME: Chantel Webber  AGE: 58 y.o. SEX: female  : 1960     DATE: 2023     Assessment and Plan:     Healthy 58year old female    Immunizations and preventive care screenings were discussed with patient today. Appropriate education was printed on patient's after visit summary. Counseling:  Alcohol/drug use: discussed moderation in alcohol intake, the recommendations for healthy alcohol use, and avoidance of illicit drug use. Dental Health: discussed importance of regular tooth brushing, flossing, and dental visits. Injury prevention: discussed safety/seat belts, safety helmets, smoke detectors, carbon dioxide detectors, and smoking near bedding or upholstery. Sexual health: discussed sexually transmitted diseases, partner selection, use of condoms, avoidance of unintended pregnancy, and contraceptive alternatives. · Exercise: the importance of regular exercise/physical activity was discussed. Recommend exercise 3-5 times per week for at least 30 minutes. · Continue Tricor and levothyroxine, labs reviewed today  · Mammo, cologard ordered  · Obtain shingles         Return in 1 year (on 2024). Chief Complaint:     Chief Complaint   Patient presents with   • Annual Exam      History of Present Illness:     Adult Annual Physical   Patient here for a comprehensive physical exam. The patient reports no problems. Diet and Physical Activity  · Diet/Nutrition: well balanced diet. · Exercise: swimming. Depression Screening  PHQ-2/9 Depression Screening    Little interest or pleasure in doing things: 0 - not at all  Feeling down, depressed, or hopeless: 0 - not at all  PHQ-2 Score: 0  PHQ-2 Interpretation: Negative depression screen       General Health  · Sleep: sleeps well.    · Hearing: normal - bilateral.  · Vision: goes for regular eye exams and most recent eye exam <1 year ago. · Dental: regular dental visits and brushes teeth twice daily. /GYN Health  · Patient is: postmenopausal  · Mammo: 1/2023  · Colon: due, cologard reordered       Review of Systems:     Review of Systems   Constitutional: Negative. HENT: Negative. Eyes: Negative. Respiratory: Negative. Cardiovascular: Negative. Gastrointestinal: Negative. Endocrine: Negative. Genitourinary: Negative. Musculoskeletal: Negative. Skin: Negative. Allergic/Immunologic: Negative. Neurological: Negative. Hematological: Negative. Psychiatric/Behavioral: Negative. Past Medical History:     Past Medical History:   Diagnosis Date   • Abnormal liver function test     Last Assessed: 7/26/2012   • Diffuse nontoxic goiter    • Elevated blood pressure reading     Last Assessed: 1/29/2013   • Hematuria    • Nonspecific abnormal results of function study of thyroid     Lsat Assessed: 7/26/2012   • Seborrhea    • Seborrheic keratosis     Last Assessed: 2/6/2013      Past Surgical History:     History reviewed. No pertinent surgical history.    Social History:     Social History     Socioeconomic History   • Marital status: /Civil Union     Spouse name: None   • Number of children: None   • Years of education: None   • Highest education level: None   Occupational History   • None   Tobacco Use   • Smoking status: Former     Types: Cigarettes   • Smokeless tobacco: Never   • Tobacco comments:     1/4 of a pack a day   Vaping Use   • Vaping Use: Never used   Substance and Sexual Activity   • Alcohol use: Yes     Comment: social - 1 glass of wine a month   • Drug use: No   • Sexual activity: None   Other Topics Concern   • None   Social History Narrative    Uses safety equipment - seatbelts     Social Determinants of Health     Financial Resource Strain: Not on file   Food Insecurity: Not on file   Transportation Needs: Not on file   Physical Activity: Not on file   Stress: Not on file Social Connections: Not on file   Intimate Partner Violence: Not on file   Housing Stability: Not on file      Family History:     Family History   Problem Relation Age of Onset   • Heart disease Mother    • Heart defect Mother    • Hypertension Father    • Diabetes type II Father    • No Known Problems Daughter    • No Known Problems Maternal Grandmother    • No Known Problems Maternal Grandfather    • No Known Problems Paternal Grandmother    • No Known Problems Paternal Grandfather    • No Known Problems Maternal Aunt    • No Known Problems Maternal Aunt    • No Known Problems Paternal Aunt    • No Known Problems Paternal Aunt    • Alcohol abuse Neg Hx    • Substance Abuse Neg Hx    • Mental illness Neg Hx       Current Medications:     Current Outpatient Medications   Medication Sig Dispense Refill   • Ascorbic Acid (VITAMIN C) 500 MG/5ML LIQD Take 1 tablet by mouth daily     • b complex vitamins capsule Take 1 capsule by mouth daily     • Biotin 10 MG CAPS Take by mouth daily     • calcium citrate-vitamin D (CITRACAL+D) 315-200 MG-UNIT per tablet Take 1 tablet by mouth daily     • Cholecalciferol (VITAMIN D3) 1000 UNIT/SPRAY LIQD Take 1 capsule by mouth daily     • fenofibrate (TRICOR) 145 mg tablet take 1 tablet by mouth once daily 90 tablet 0   • levothyroxine 88 mcg tablet take 1 tablet by mouth once daily 90 tablet 0   • Multiple Vitamins-Minerals (MULTI-VITAMIN/MINERALS PO) Take 1 tablet by mouth daily     • Omega-3 Fatty Acids (OMEGA-3 FISH OIL) 1000 MG CAPS Take 1 capsule by mouth daily     • propranolol (INDERAL) 40 mg tablet take 30 MINUTES prior to procedure as directed by prescriber 20 tablet 0   • vitamin E 400 UNIT/15ML LIQD Take 1 capsule by mouth daily     • EPINEPHrine (EPIPEN) 0.3 mg/0.3 mL SOAJ Inject as directed (Patient not taking: Reported on 7/28/2023)       No current facility-administered medications for this visit. Allergies:      Allergies   Allergen Reactions   • Bee Venom Edema Physical Exam:     /82   Pulse 62   Temp 97.9 °F (36.6 °C)   Resp 16   Ht 5' 6.5" (1.689 m)   Wt 86.5 kg (190 lb 12.8 oz)   SpO2 96%   BMI 30.33 kg/m²     Physical Exam  Constitutional:       Appearance: Normal appearance. She is well-developed and normal weight. HENT:      Head: Normocephalic and atraumatic. Right Ear: Hearing normal.      Left Ear: Hearing normal.      Mouth/Throat:      Pharynx: Uvula midline. Eyes:      Extraocular Movements: Extraocular movements intact. Conjunctiva/sclera: Conjunctivae normal.      Pupils: Pupils are equal, round, and reactive to light. Neck:      Thyroid: No thyromegaly. Vascular: No carotid bruit. Cardiovascular:      Rate and Rhythm: Normal rate and regular rhythm. Pulses: Normal pulses. Heart sounds: Normal heart sounds. No murmur heard. Pulmonary:      Effort: Pulmonary effort is normal.      Breath sounds: Normal breath sounds. Abdominal:      General: Abdomen is flat. Bowel sounds are normal. There is no distension. Palpations: Abdomen is soft. There is no mass. Tenderness: There is no abdominal tenderness. Musculoskeletal:         General: Normal range of motion. Cervical back: Normal range of motion and neck supple. No tenderness. Right lower leg: No edema. Left lower leg: No edema. Lymphadenopathy:      Cervical: No cervical adenopathy. Skin:     General: Skin is warm. Neurological:      General: No focal deficit present. Mental Status: She is alert and oriented to person, place, and time. Cranial Nerves: No cranial nerve deficit. Deep Tendon Reflexes: Reflexes normal.   Psychiatric:         Mood and Affect: Mood normal.         Behavior: Behavior normal.         Thought Content:  Thought content normal.         Judgment: Judgment normal.          Jonathan Bryson PA-C  [address]

## 2023-08-24 ENCOUNTER — HOSPITAL ENCOUNTER (OUTPATIENT)
Dept: MAMMOGRAPHY | Facility: IMAGING CENTER | Age: 63
Discharge: HOME | End: 2023-08-24
Payer: COMMERCIAL

## 2023-08-24 VITALS — WEIGHT: 188 LBS | BODY MASS INDEX: 29.51 KG/M2 | HEIGHT: 67 IN

## 2023-08-24 DIAGNOSIS — Z12.31 ENCOUNTER FOR SCREENING MAMMOGRAM FOR MALIGNANT NEOPLASM OF BREAST: ICD-10-CM

## 2023-08-24 PROCEDURE — 77063 BREAST TOMOSYNTHESIS BI: CPT

## 2023-08-24 PROCEDURE — 77067 SCR MAMMO BI INCL CAD: CPT

## 2023-09-19 ENCOUNTER — IMMUNIZATIONS (OUTPATIENT)
Dept: FAMILY MEDICINE CLINIC | Facility: CLINIC | Age: 63
End: 2023-09-19
Payer: COMMERCIAL

## 2023-09-19 DIAGNOSIS — Z23 NEED FOR INFLUENZA VACCINATION: Primary | ICD-10-CM

## 2023-09-19 PROCEDURE — 90471 IMMUNIZATION ADMIN: CPT

## 2023-09-19 PROCEDURE — 90686 IIV4 VACC NO PRSV 0.5 ML IM: CPT

## 2023-11-17 DIAGNOSIS — E78.1 ESSENTIAL HYPERTRIGLYCERIDEMIA: ICD-10-CM

## 2023-11-17 DIAGNOSIS — E03.9 HYPOTHYROIDISM, UNSPECIFIED TYPE: ICD-10-CM

## 2023-11-20 RX ORDER — FENOFIBRATE 145 MG/1
145 TABLET, COATED ORAL DAILY
Qty: 90 TABLET | Refills: 0 | Status: SHIPPED | OUTPATIENT
Start: 2023-11-20

## 2023-11-20 RX ORDER — LEVOTHYROXINE SODIUM 88 UG/1
88 TABLET ORAL DAILY
Qty: 90 TABLET | Refills: 0 | Status: SHIPPED | OUTPATIENT
Start: 2023-11-20

## 2024-02-15 DIAGNOSIS — E03.9 HYPOTHYROIDISM, UNSPECIFIED TYPE: ICD-10-CM

## 2024-02-15 DIAGNOSIS — E78.1 ESSENTIAL HYPERTRIGLYCERIDEMIA: ICD-10-CM

## 2024-02-15 RX ORDER — FENOFIBRATE 145 MG/1
145 TABLET, COATED ORAL DAILY
Qty: 90 TABLET | Refills: 0 | Status: SHIPPED | OUTPATIENT
Start: 2024-02-15

## 2024-02-15 RX ORDER — LEVOTHYROXINE SODIUM 88 UG/1
88 TABLET ORAL DAILY
Qty: 90 TABLET | Refills: 0 | Status: SHIPPED | OUTPATIENT
Start: 2024-02-15

## 2024-02-19 DIAGNOSIS — Z00.6 ENCOUNTER FOR EXAMINATION FOR NORMAL COMPARISON OR CONTROL IN CLINICAL RESEARCH PROGRAM: ICD-10-CM

## 2024-05-06 ENCOUNTER — TELEMEDICINE (OUTPATIENT)
Dept: FAMILY MEDICINE CLINIC | Facility: CLINIC | Age: 64
End: 2024-05-06
Payer: COMMERCIAL

## 2024-05-06 DIAGNOSIS — U07.1 COVID-19: Primary | ICD-10-CM

## 2024-05-06 PROCEDURE — 99213 OFFICE O/P EST LOW 20 MIN: CPT | Performed by: PHYSICIAN ASSISTANT

## 2024-05-06 RX ORDER — NIRMATRELVIR AND RITONAVIR 300-100 MG
3 KIT ORAL 2 TIMES DAILY
Qty: 30 TABLET | Refills: 0 | Status: SHIPPED | OUTPATIENT
Start: 2024-05-06 | End: 2024-05-11

## 2024-05-06 NOTE — PROGRESS NOTES
COVID-19 Outpatient Progress Note    Assessment/Plan:    Covid - reassurance, symptomatic relief, fluids, rest. Prescribed paxlovid but patient will wait to see how symptoms progress.    F/u as needed and as scheduled     Disposition:     Discussed symptom directed medication options with patient. Discussed vitamin D, vitamin C, and/or zinc supplementation with patient.     Patient meets criteria for Paxlovid and they have been counseled appropriately regarding risks, benefits, side effects, and alternative treatment options. After discussion, patient agrees to treatment.    Possible side effects of Paxlovid?    Possible side effects of Paxlovid are:  - Liver Problems. Notify us right away if you start to experience loss of appetite, yellowing of your skin and the whites of eyes (jaundice), dark-colored urine, pale colored stools and itchy skin, stomach area (abdominal) pain.  - Resistance to HIV Medicines. If you have untreated HIV infection, Paxlovid may lead to some HIV medicines not working as well in the future.  - Other possible side effects include: altered sense of taste, diarrhea, high blood pressure, or muscle aches.    I have spent a total time of 15 minutes on the day of the encounter for this patient including risks and benefits of treatment options, patient and family education, importance of treatment compliance, risk factor reductions, impressions and counseling/coordination of care.       Encounter provider: Rebeca Obregon PA-C     Provider located at: [facility]  [address]  [address] [address] [phone]     Recent Visits  No visits were found meeting these conditions.  Showing recent visits within past 7 days and meeting all other requirements  Today's Visits  Date Type Provider Dept   05/06/24 Telemedicine Rebeca Obregon PA-C [facility]   Showing today's visits and  meeting all other requirements  Future Appointments  No visits were found meeting these conditions.  Showing future appointments within next 150 days and meeting all other requirements     This virtual check-in was done via [employer] Embedded and patient was informed that this is a secure, HIPAA-compliant platform. She agrees to proceed.    Patient agrees to participate in a virtual check in via telephone or video visit instead of presenting to the office to address urgent/immediate medical needs. Patient is aware this is a billable service. She acknowledged consent and understanding of privacy and security of the video platform. The patient has agreed to participate and understands they can discontinue the visit at any time.    After connecting through [employer], the patient was identified by name and date of birth. Jerardo Sanon was informed that this was a telemedicine visit and that the exam was being conducted confidentially over secure lines. My office door was closed. No one else was in the room. Jerardo Sanon acknowledged consent and understanding of privacy and security of the telemedicine visit. I informed the patient that I have reviewed her record in Epic and presented the opportunity for her to ask any questions regarding the visit today. The patient agreed to participate.     Verification of patient location:  Patient is located in the following state in which I hold an active license: [place]    Subjective:   Jerardo Sanon is a 64 y.o. female who has been screened for COVID-19. Symptom change since last report: unchanged. Patient's symptoms include fever, chills, fatigue, malaise, nasal congestion, rhinorrhea, sore throat, cough, nausea, myalgias and headache. Patient denies shortness of breath, chest tightness, abdominal pain, vomiting and diarrhea.     - Date of symptom onset: 5/5/2024  - Date of positive COVID-19 test: 5/5/2024. Type of test: Home antigen. Patient with typical symptoms of COVID-19 and they attest  "that they were positive on home rapid antigen testing. Image of positive result is not able to be uploaded into their chart.     COVID-19 vaccination status: Fully vaccinated with booster    Jerardo has been staying home and has isolated themselves in her home. She is taking care to not share personal items and is cleaning all surfaces that are touched often, like counters, tabletops, and doorknobs using household cleaning sprays or wipes. She is wearing a mask when she leaves her room.     No results found for: \"SARSCOV2\", \"[id number]\", \"SARSCORONAVI\", \"CORONAVIRUSR\", \"SARSCOVAG\", \"SARSCOVAGH\"    Review of Systems   Constitutional:  Positive for chills, fatigue and fever.   HENT:  Positive for congestion, rhinorrhea and sore throat.    Respiratory:  Positive for cough. Negative for chest tightness and shortness of breath.    Gastrointestinal:  Positive for nausea. Negative for abdominal pain, diarrhea and vomiting.   Musculoskeletal:  Positive for myalgias.   Neurological:  Positive for headaches.     Current Outpatient Medications on File Prior to Visit   Medication Sig    Ascorbic Acid (VITAMIN C) 500 MG/5ML LIQD Take 1 tablet by mouth daily    b complex vitamins capsule Take 1 capsule by mouth daily    Biotin 10 MG CAPS Take by mouth daily    calcium citrate-vitamin D (CITRACAL+D) 315-200 MG-UNIT per tablet Take 1 tablet by mouth daily    Cholecalciferol (VITAMIN D3) 1000 UNIT/SPRAY LIQD Take 1 capsule by mouth daily    fenofibrate (TRICOR) 145 mg tablet take 1 tablet by mouth once daily    levothyroxine 88 mcg tablet take 1 tablet by mouth once daily    Multiple Vitamins-Minerals (MULTI-VITAMIN/MINERALS PO) Take 1 tablet by mouth daily    Omega-3 Fatty Acids (OMEGA-3 FISH OIL) 1000 MG CAPS Take 1 capsule by mouth daily    vitamin E 400 UNIT/15ML LIQD Take 1 capsule by mouth daily    EPINEPHrine (EPIPEN) 0.3 mg/0.3 mL SOAJ Inject 0.3 mL (0.3 mg total) into a muscle once for 1 dose    propranolol (INDERAL) 40 mg " tablet take 30 MINUTES prior to procedure as directed by prescriber (Patient not taking: Reported on 5/6/2024)       Objective:  There were no vitals filed for this visit.        Physical Exam  Constitutional:       General: She is not in acute distress.     Appearance: Normal appearance. She is not ill-appearing or toxic-appearing.   HENT:      Head: Normocephalic and atraumatic.   Pulmonary:      Effort: Pulmonary effort is normal. No respiratory distress.   Neurological:      General: No focal deficit present.      Mental Status: She is alert and oriented to person, place, and time.   Psychiatric:         Mood and Affect: Mood normal.         Behavior: Behavior normal.         Thought Content: Thought content normal.         Judgment: Judgment normal.       Rebeca Obregon PA-C

## 2024-05-17 DIAGNOSIS — E03.9 HYPOTHYROIDISM, UNSPECIFIED TYPE: ICD-10-CM

## 2024-05-17 DIAGNOSIS — E78.1 ESSENTIAL HYPERTRIGLYCERIDEMIA: ICD-10-CM

## 2024-05-18 RX ORDER — LEVOTHYROXINE SODIUM 88 UG/1
88 TABLET ORAL DAILY
Qty: 90 TABLET | Refills: 1 | Status: SHIPPED | OUTPATIENT
Start: 2024-05-18

## 2024-05-18 RX ORDER — FENOFIBRATE 145 MG/1
145 TABLET, COATED ORAL DAILY
Qty: 90 TABLET | Refills: 1 | Status: SHIPPED | OUTPATIENT
Start: 2024-05-18

## 2024-06-03 ENCOUNTER — TELEMEDICINE (OUTPATIENT)
Dept: FAMILY MEDICINE CLINIC | Facility: CLINIC | Age: 64
End: 2024-06-03
Payer: MEDICARE

## 2024-06-03 VITALS — BODY MASS INDEX: 27.12 KG/M2 | WEIGHT: 170.6 LBS

## 2024-06-03 DIAGNOSIS — M54.50 LOW BACK PAIN RADIATING TO RIGHT LEG: Primary | ICD-10-CM

## 2024-06-03 DIAGNOSIS — N39.3 FEMALE STRESS INCONTINENCE: ICD-10-CM

## 2024-06-03 DIAGNOSIS — M79.604 LOW BACK PAIN RADIATING TO RIGHT LEG: Primary | ICD-10-CM

## 2024-06-03 PROCEDURE — 99213 OFFICE O/P EST LOW 20 MIN: CPT | Performed by: PHYSICIAN ASSISTANT

## 2024-06-03 PROCEDURE — G2211 COMPLEX E/M VISIT ADD ON: HCPCS | Performed by: PHYSICIAN ASSISTANT

## 2024-06-03 RX ORDER — DIAZEPAM 5 MG/1
5 TABLET ORAL
Qty: 10 TABLET | Refills: 0 | Status: SHIPPED | OUTPATIENT
Start: 2024-06-03

## 2024-06-03 RX ORDER — PREDNISONE 10 MG/1
TABLET ORAL
Qty: 20 TABLET | Refills: 0 | OUTPATIENT
Start: 2024-06-03 | End: 2024-06-08

## 2024-06-03 NOTE — PROGRESS NOTES
Virtual Regular Visit    Verification of patient location:    Patient is located at Home in the following state in which I hold an active license [place]      Assessment/Plan:    Low back pain with radiculopathy - will treat with prednisone taper, valium at night prn, encouraged to continue stretching, walking. If no improvement will need to start PT     F/u as needed    Reason for visit is   Chief Complaint   Patient presents with    Pain     sciatic nerve pain  For past 4 days having pain  No sleep   Starting to affect driving  Hot compresses, cold compresses w/o improvement     Virtual Regular Visit          Encounter provider Rebeca Obregon PA-C      Recent Visits  No visits were found meeting these conditions.  Showing recent visits within past 7 days and meeting all other requirements  Today's Visits  Date Type Provider Dept   06/03/24 Telemedicine Rebeca Obregon PA-C [facility]   Showing today's visits and meeting all other requirements  Future Appointments  No visits were found meeting these conditions.  Showing future appointments within next 150 days and meeting all other requirements       The patient was identified by name and date of birth. Jerardo Sanon was informed that this is a telemedicine visit and that the visit is being conducted through the Epic Embedded platform. She agrees to proceed..  My office door was closed. No one else was in the room.  She acknowledged consent and understanding of privacy and security of the video platform. The patient has agreed to participate and understands they can discontinue the visit at any time.    Patient is aware this is a billable service.     Subjective  Jerardo Sanon is a 64 y.o. female        Patient dx with covid back 5/6/2024, put on paxlovid and got rebound covid. Just ending incubation. Was doing a lot of sitting in quarantine, started with right low back pain, progressively got worse, radiating down right leg. Tolerable during  day then at night around 8 pm becomes intense, sharp, burning pain, cannot sleep, cannot get comfortable. Has gone without sleep for the past 3-4 nights. Tried heat, ice, stretching, ibuprofen, tylenol. All with no relief.     Denies urinary symptoms.    Preferred virtual because she has not been sleeping and concerned between pain and fatigue it was not safe to drive.         Past Medical History:   Diagnosis Date    Abnormal liver function test     Last Assessed: 7/26/2012    Diffuse nontoxic goiter     Elevated blood pressure reading     Last Assessed: 1/29/2013    Hematuria     Nonspecific abnormal results of function study of thyroid     Lsat Assessed: 7/26/2012    Seborrhea     Seborrheic keratosis     Last Assessed: 2/6/2013       History reviewed. No pertinent surgical history.    Current Outpatient Medications   Medication Sig Dispense Refill    Ascorbic Acid (VITAMIN C) 500 MG/5ML LIQD Take 1 tablet by mouth daily      b complex vitamins capsule Take 1 capsule by mouth daily      Biotin 10 MG CAPS Take by mouth daily      calcium citrate-vitamin D (CITRACAL+D) 315-200 MG-UNIT per tablet Take 1 tablet by mouth daily      Cholecalciferol (VITAMIN D3) 1000 UNIT/SPRAY LIQD Take 1 capsule by mouth daily      fenofibrate (TRICOR) 145 mg tablet take 1 tablet by mouth once daily 90 tablet 1    levothyroxine 88 mcg tablet take 1 tablet by mouth once daily 90 tablet 1    Multiple Vitamins-Minerals (MULTI-VITAMIN/MINERALS PO) Take 1 tablet by mouth daily      Omega-3 Fatty Acids (OMEGA-3 FISH OIL) 1000 MG CAPS Take 1 capsule by mouth daily      vitamin E 400 UNIT/15ML LIQD Take 1 capsule by mouth daily      EPINEPHrine (EPIPEN) 0.3 mg/0.3 mL SOAJ Inject 0.3 mL (0.3 mg total) into a muscle once for 1 dose 0.6 mL 3    propranolol (INDERAL) 40 mg tablet take 30 MINUTES prior to procedure as directed by prescriber (Patient not taking: Reported on 5/6/2024) 20 tablet 0     No current facility-administered medications for  this visit.        Allergies   Allergen Reactions    Bee Venom Edema       Review of Systems    Video Exam    Vitals:    06/03/24 0839   Weight: 77.4 kg (170 lb 9.6 oz)       Physical Exam  Constitutional:       General: She is not in acute distress.     Appearance: Normal appearance. She is normal weight.   HENT:      Head: Normocephalic and atraumatic.   Neurological:      General: No focal deficit present.      Mental Status: She is alert and oriented to person, place, and time.   Psychiatric:         Mood and Affect: Mood normal.         Behavior: Behavior normal.         Thought Content: Thought content normal.         Judgment: Judgment normal.          Visit Time  Total Visit Duration: 15

## 2024-06-08 ENCOUNTER — HOSPITAL ENCOUNTER (EMERGENCY)
Facility: HOSPITAL | Age: 64
Discharge: HOME | End: 2024-06-08
Attending: EMERGENCY MEDICINE
Payer: MEDICARE

## 2024-06-08 VITALS
WEIGHT: 165 LBS | OXYGEN SATURATION: 94 % | HEART RATE: 78 BPM | DIASTOLIC BLOOD PRESSURE: 61 MMHG | SYSTOLIC BLOOD PRESSURE: 108 MMHG | TEMPERATURE: 97.9 F | BODY MASS INDEX: 26.23 KG/M2 | RESPIRATION RATE: 18 BRPM

## 2024-06-08 DIAGNOSIS — S05.02XA LEFT CORNEAL ABRASION, INITIAL ENCOUNTER: ICD-10-CM

## 2024-06-08 DIAGNOSIS — M54.41 ACUTE RIGHT-SIDED LOW BACK PAIN WITH RIGHT-SIDED SCIATICA: ICD-10-CM

## 2024-06-08 DIAGNOSIS — G51.0 BELL'S PALSY: Primary | ICD-10-CM

## 2024-06-08 LAB
ALBUMIN SERPL BCP-MCNC: 3.6 G/DL (ref 3.5–5)
ALP SERPL-CCNC: 56 U/L (ref 34–104)
ALT SERPL W P-5'-P-CCNC: 15 U/L (ref 7–52)
ANION GAP SERPL CALCULATED.3IONS-SCNC: 7 MMOL/L (ref 4–13)
AST SERPL W P-5'-P-CCNC: 14 U/L (ref 13–39)
BASOPHILS # BLD MANUAL: 0 THOUSAND/UL (ref 0–0.1)
BASOPHILS NFR MAR MANUAL: 0 % (ref 0–1)
BILIRUB SERPL-MCNC: 0.88 MG/DL (ref 0.2–1)
BUN SERPL-MCNC: 24 MG/DL (ref 5–25)
CALCIUM SERPL-MCNC: 9.2 MG/DL (ref 8.4–10.2)
CHLORIDE SERPL-SCNC: 90 MMOL/L (ref 96–108)
CO2 SERPL-SCNC: 29 MMOL/L (ref 21–32)
CREAT SERPL-MCNC: 0.99 MG/DL (ref 0.6–1.3)
EOSINOPHIL # BLD MANUAL: 0 THOUSAND/UL (ref 0–0.4)
EOSINOPHIL NFR BLD MANUAL: 0 % (ref 0–6)
ERYTHROCYTE [DISTWIDTH] IN BLOOD BY AUTOMATED COUNT: 13 % (ref 11.6–15.1)
GFR SERPL CREATININE-BSD FRML MDRD: 59 ML/MIN/1.73SQ M
GLUCOSE SERPL-MCNC: 183 MG/DL (ref 65–140)
HCT VFR BLD AUTO: 37.9 % (ref 34.8–46.1)
HGB BLD-MCNC: 12.9 G/DL (ref 11.5–15.4)
LYMPHOCYTES # BLD AUTO: 0.89 THOUSAND/UL (ref 0.6–4.47)
LYMPHOCYTES # BLD AUTO: 4 % (ref 14–44)
MCH RBC QN AUTO: 28.7 PG (ref 26.8–34.3)
MCHC RBC AUTO-ENTMCNC: 34 G/DL (ref 31.4–37.4)
MCV RBC AUTO: 84 FL (ref 82–98)
METAMYELOCYTE ABSOLUTE CT: 0.22 THOUSAND/UL (ref 0–0.1)
METAMYELOCYTES NFR BLD MANUAL: 1 % (ref 0–1)
MONOCYTES # BLD AUTO: 0 THOUSAND/UL (ref 0–1.22)
MONOCYTES NFR BLD: 0 % (ref 4–12)
NEUTROPHILS # BLD MANUAL: 21.23 THOUSAND/UL (ref 1.85–7.62)
NEUTS BAND NFR BLD MANUAL: 1 % (ref 0–8)
NEUTS SEG NFR BLD AUTO: 94 % (ref 43–75)
PLATELET # BLD AUTO: 495 THOUSANDS/UL (ref 149–390)
PLATELET BLD QL SMEAR: ABNORMAL
PMV BLD AUTO: 8.7 FL (ref 8.9–12.7)
POTASSIUM SERPL-SCNC: 3.8 MMOL/L (ref 3.5–5.3)
PROT SERPL-MCNC: 7 G/DL (ref 6.4–8.4)
RBC # BLD AUTO: 4.5 MILLION/UL (ref 3.81–5.12)
RBC MORPH BLD: NORMAL
SODIUM SERPL-SCNC: 126 MMOL/L (ref 135–147)
TOXIC GRANULES BLD QL SMEAR: PRESENT
WBC # BLD AUTO: 22.35 THOUSAND/UL (ref 4.31–10.16)
WBC TOXIC VACUOLES BLD QL SMEAR: PRESENT

## 2024-06-08 PROCEDURE — 86618 LYME DISEASE ANTIBODY: CPT

## 2024-06-08 PROCEDURE — 99285 EMERGENCY DEPT VISIT HI MDM: CPT

## 2024-06-08 PROCEDURE — 85027 COMPLETE CBC AUTOMATED: CPT | Performed by: EMERGENCY MEDICINE

## 2024-06-08 PROCEDURE — 36415 COLL VENOUS BLD VENIPUNCTURE: CPT

## 2024-06-08 PROCEDURE — 80053 COMPREHEN METABOLIC PANEL: CPT | Performed by: EMERGENCY MEDICINE

## 2024-06-08 PROCEDURE — 99283 EMERGENCY DEPT VISIT LOW MDM: CPT

## 2024-06-08 PROCEDURE — 85007 BL SMEAR W/DIFF WBC COUNT: CPT | Performed by: EMERGENCY MEDICINE

## 2024-06-08 PROCEDURE — 86617 LYME DISEASE ANTIBODY: CPT

## 2024-06-08 RX ORDER — ERYTHROMYCIN 5 MG/G
0.5 OINTMENT OPHTHALMIC ONCE
Status: COMPLETED | OUTPATIENT
Start: 2024-06-08 | End: 2024-06-08

## 2024-06-08 RX ORDER — DOXYCYCLINE HYCLATE 100 MG/1
100 CAPSULE ORAL 2 TIMES DAILY
Qty: 28 CAPSULE | Refills: 0 | Status: SHIPPED | OUTPATIENT
Start: 2024-06-08 | End: 2024-06-22

## 2024-06-08 RX ORDER — DOXYCYCLINE HYCLATE 100 MG/1
100 CAPSULE ORAL ONCE
Status: COMPLETED | OUTPATIENT
Start: 2024-06-08 | End: 2024-06-08

## 2024-06-08 RX ORDER — MINERAL OIL AND PETROLATUM 150; 830 MG/G; MG/G
OINTMENT OPHTHALMIC ONCE
Status: COMPLETED | OUTPATIENT
Start: 2024-06-08 | End: 2024-06-08

## 2024-06-08 RX ORDER — ERYTHROMYCIN 5 MG/G
OINTMENT OPHTHALMIC 4 TIMES DAILY
Qty: 3.5 G | Refills: 0 | Status: SHIPPED | OUTPATIENT
Start: 2024-06-08 | End: 2024-06-13

## 2024-06-08 RX ORDER — MINERAL OIL, PETROLATUM 425; 573 MG/G; MG/G
OINTMENT OPHTHALMIC AS NEEDED
Qty: 3.5 G | Refills: 0 | Status: SHIPPED | OUTPATIENT
Start: 2024-06-08 | End: 2024-06-22

## 2024-06-08 RX ORDER — TETRACAINE HYDROCHLORIDE 5 MG/ML
1 SOLUTION OPHTHALMIC ONCE
Status: COMPLETED | OUTPATIENT
Start: 2024-06-08 | End: 2024-06-08

## 2024-06-08 RX ORDER — PREDNISONE 20 MG/1
TABLET ORAL
Qty: 26 TABLET | Refills: 0 | Status: SHIPPED | OUTPATIENT
Start: 2024-06-08 | End: 2024-06-22

## 2024-06-08 RX ADMIN — ERYTHROMYCIN 0.5 INCH: 5 OINTMENT OPHTHALMIC at 16:29

## 2024-06-08 RX ADMIN — FLUORESCEIN SODIUM 1 STRIP: 1 STRIP OPHTHALMIC at 16:04

## 2024-06-08 RX ADMIN — DOXYCYCLINE 100 MG: 100 CAPSULE ORAL at 14:57

## 2024-06-08 RX ADMIN — TETRACAINE HYDROCHLORIDE 1 DROP: 5 SOLUTION OPHTHALMIC at 16:04

## 2024-06-08 RX ADMIN — WHITE PETROLATUM 57.7 %-MINERAL OIL 31.9 % EYE OINTMENT 1 APPLICATION: at 14:57

## 2024-06-08 NOTE — DISCHARGE INSTRUCTIONS
Take the prescribed medications as directed for the full duration of their course.  I have placed a referral to neurology so you may closely follow-up regarding her Bell's palsy as well as the comprehensive spine program regarding your acute low back pain.  Please also call your primary care provider to notify of your visit to the ER today.    Return to the ER if you develop severe headache, vision changes including double vision or loss of vision, slurred speech, severe chest pain, difficulty breathing, vomiting, weakness, confusion, or lethargy.

## 2024-06-08 NOTE — ED PROVIDER NOTES
History  Chief Complaint   Patient presents with    Facial Droop     Pt to ED c/o R side facial droop. Pt states droop started about 2 days ago. No CP, SOB, denies weakness. Sciatic flare up X 10days. No HA, dizziness. Blurred vision on L eye.      The patient is a 64-year-old presenting for evaluation of 2 to 3 days of left-sided facial droop.  The patient notes on Wednesday having flattening of her left forehead.  She notes over the past 2 to 3 days having flattening/drooping of the entire left side of her face.  She denies associated headaches.  She notes the left eyelid does not fully close and she does have some blurring of the vision of the left eye.  She was recently seen by telehealth appointment 5 days ago due to severe right lower back pain that radiates down the right leg.  She was started on prednisone taper and as needed Valium.  She notes her low back pain has notably improved.  Given that episode of pain with new facial droop, her family wanted her evaluated for possible stroke.  The patient denies trauma and falls.  She denies recent illnesses including fevers, URI symptoms, sore throat, and vomiting.  She does spend time outside and is exposed to ticks but denies recent tick bites.  She does report a mild rash to the torso which she is uncertain if it is related.      History provided by:  Patient   used: No        Prior to Admission Medications   Prescriptions Last Dose Informant Patient Reported? Taking?   Ascorbic Acid (VITAMIN C) 500 MG/5ML LIQD   Yes No   Sig: Take 1 tablet by mouth daily   Biotin 10 MG CAPS   Yes No   Sig: Take by mouth daily   Cholecalciferol (VITAMIN D3) 1000 UNIT/SPRAY LIQD   Yes No   Sig: Take 1 capsule by mouth daily   EPINEPHrine (EPIPEN) 0.3 mg/0.3 mL SOAJ   No No   Sig: Inject 0.3 mL (0.3 mg total) into a muscle once for 1 dose   Multiple Vitamins-Minerals (MULTI-VITAMIN/MINERALS PO)   Yes No   Sig: Take 1 tablet by mouth daily   Omega-3 Fatty Acids  (OMEGA-3 FISH OIL) 1000 MG CAPS   Yes No   Sig: Take 1 capsule by mouth daily   b complex vitamins capsule   Yes No   Sig: Take 1 capsule by mouth daily   calcium citrate-vitamin D (CITRACAL+D) 315-200 MG-UNIT per tablet   Yes No   Sig: Take 1 tablet by mouth daily   diazepam (VALIUM) 5 mg tablet   No No   Sig: Take 1 tablet (5 mg total) by mouth daily at bedtime as needed for sleep or muscle spasms   fenofibrate (TRICOR) 145 mg tablet   No No   Sig: take 1 tablet by mouth once daily   levothyroxine 88 mcg tablet   No No   Sig: take 1 tablet by mouth once daily   predniSONE 10 mg tablet   No No   Sig: Take 4 tabs on day 1,2 with food, 3 tabs on day 3,4 with food, 2 tabs on day 5,6 with food, 1 tab on day 7,8 with food   propranolol (INDERAL) 40 mg tablet   No No   Sig: take 30 MINUTES prior to procedure as directed by prescriber   Patient not taking: Reported on 5/6/2024   vitamin E 400 UNIT/15ML LIQD   Yes No   Sig: Take 1 capsule by mouth daily      Facility-Administered Medications: None       Past Medical History:   Diagnosis Date    Abnormal liver function test     Last Assessed: 7/26/2012    Diffuse nontoxic goiter     Elevated blood pressure reading     Last Assessed: 1/29/2013    Hematuria     Nonspecific abnormal results of function study of thyroid     Lsat Assessed: 7/26/2012    Seborrhea     Seborrheic keratosis     Last Assessed: 2/6/2013       History reviewed. No pertinent surgical history.    Family History   Problem Relation Age of Onset    Heart disease Mother     Heart defect Mother     Hypertension Father     Diabetes type II Father     No Known Problems Daughter     No Known Problems Maternal Grandmother     No Known Problems Maternal Grandfather     No Known Problems Paternal Grandmother     No Known Problems Paternal Grandfather     No Known Problems Brother     No Known Problems Maternal Aunt     No Known Problems Maternal Aunt     No Known Problems Paternal Aunt     No Known Problems  Paternal Aunt     Alcohol abuse Neg Hx     Substance Abuse Neg Hx     Mental illness Neg Hx      I have reviewed and agree with the history as documented.    E-Cigarette/Vaping    E-Cigarette Use Never User      E-Cigarette/Vaping Substances     Social History     Tobacco Use    Smoking status: Former     Types: Cigarettes    Smokeless tobacco: Never    Tobacco comments:     1/4 of a pack a day   Vaping Use    Vaping status: Never Used   Substance Use Topics    Alcohol use: Yes     Comment: social - 1 glass of wine a month    Drug use: No       Review of Systems   Constitutional:  Negative for chills and fever.   HENT: Negative.     Eyes:  Positive for visual disturbance (Blurring of vision of left eye). Negative for photophobia, pain, discharge, redness and itching.        Left eye dryness   Respiratory:  Negative for cough and shortness of breath.    Cardiovascular:  Negative for chest pain, palpitations and leg swelling.   Gastrointestinal:  Negative for abdominal pain, diarrhea, nausea and vomiting.   Genitourinary: Negative.    Musculoskeletal:  Positive for back pain (Right low back). Negative for arthralgias, gait problem, joint swelling, neck pain and neck stiffness.   Skin:  Positive for rash (Red splotches to torso). Negative for color change, pallor and wound.   Neurological:  Positive for facial asymmetry (Left-sided facial droop). Negative for dizziness, tremors, seizures, syncope, speech difficulty, weakness, light-headedness, numbness and headaches.   All other systems reviewed and are negative.      Physical Exam  Physical Exam  Vitals and nursing note reviewed.   Constitutional:       General: She is awake. She is not in acute distress.     Appearance: Normal appearance. She is well-developed. She is not ill-appearing, toxic-appearing or diaphoretic.   HENT:      Head: Normocephalic and atraumatic.      Jaw: There is normal jaw occlusion.      Right Ear: Tympanic membrane, ear canal and external ear  normal.      Left Ear: Tympanic membrane, ear canal and external ear normal.      Ears:      Comments: No rash     Nose: Nose normal. No congestion or rhinorrhea.      Mouth/Throat:      Lips: Pink. No lesions.      Mouth: Mucous membranes are moist.      Pharynx: Oropharynx is clear. Uvula midline.   Eyes:      General: Lids are normal. Lids are everted, no foreign bodies appreciated. Vision grossly intact. Gaze aligned appropriately. No visual field deficit.        Right eye: No foreign body.         Left eye: No foreign body.      Extraocular Movements: Extraocular movements intact.      Right eye: No nystagmus.      Left eye: No nystagmus.      Conjunctiva/sclera: Conjunctivae normal.      Right eye: Right conjunctiva is not injected.      Left eye: Left conjunctiva is not injected.      Pupils: Pupils are equal, round, and reactive to light.      Right eye: No corneal abrasion or fluorescein uptake.      Left eye: Corneal abrasion and fluorescein uptake present. Bravo exam negative.     Comments: Pt unable to fully close left upper eyelid. No dendritic fluorescein uptake.   Neck:      Trachea: Phonation normal. No abnormal tracheal secretions.   Cardiovascular:      Rate and Rhythm: Normal rate and regular rhythm.      Pulses:           Radial pulses are 2+ on the right side and 2+ on the left side.        Dorsalis pedis pulses are 2+ on the right side and 2+ on the left side.        Posterior tibial pulses are 2+ on the right side and 2+ on the left side.      Heart sounds: Normal heart sounds, S1 normal and S2 normal. No murmur heard.  Pulmonary:      Effort: Pulmonary effort is normal. No tachypnea or respiratory distress.      Breath sounds: Normal breath sounds and air entry. No stridor, decreased air movement or transmitted upper airway sounds. No decreased breath sounds.   Abdominal:      Palpations: Abdomen is soft.      Tenderness: There is no abdominal tenderness.   Musculoskeletal:      Cervical  back: Neck supple. No bony tenderness.      Thoracic back: No bony tenderness.      Lumbar back: Tenderness present. No bony tenderness. Positive right straight leg raise test.        Back:       Right lower leg: No edema.      Left lower leg: No edema.      Comments: WRIGHT, 5/5 strength throughout, sensation intact, no focal joint swelling. Ambulatory with steady gait.   Skin:     General: Skin is warm and dry.      Capillary Refill: Capillary refill takes less than 2 seconds.      Findings: Rash (Faint multiple lesions of the abdomen consistent with erythema migrans) present. No wound.   Neurological:      General: No focal deficit present.      Mental Status: She is alert and oriented to person, place, and time. Mental status is at baseline.      GCS: GCS eye subscore is 4. GCS verbal subscore is 5. GCS motor subscore is 6.      Cranial Nerves: Facial asymmetry (Left-sided facial droop involving the forehead) present. No cranial nerve deficit or dysarthria.      Sensory: Sensation is intact.      Motor: Motor function is intact.      Coordination: Coordination is intact.      Gait: Gait is intact.   Psychiatric:         Behavior: Behavior is cooperative.         Vital Signs  ED Triage Vitals   Temperature Pulse Respirations Blood Pressure SpO2   06/08/24 1413 06/08/24 1411 06/08/24 1411 06/08/24 1411 06/08/24 1411   97.9 °F (36.6 °C) 83 18 108/74 96 %      Temp Source Heart Rate Source Patient Position - Orthostatic VS BP Location FiO2 (%)   06/08/24 1413 06/08/24 1411 06/08/24 1411 06/08/24 1411 --   Temporal Monitor Sitting Right arm       Pain Score       06/08/24 1411       No Pain           Vitals:    06/08/24 1411 06/08/24 1610   BP: 108/74 108/61   Pulse: 83 78   Patient Position - Orthostatic VS: Sitting Sitting         Visual Acuity  Visual Acuity      Flowsheet Row Most Recent Value   Visual acuity R eye is 20/50   Visual acuity Left eye is 20/200   Visual acuity in both eyes is 20/50            ED  Medications  Medications   artificial tear ophthalmic ointment (1 Application Left Eye Given 6/8/24 1457)   doxycycline hyclate (VIBRAMYCIN) capsule 100 mg (100 mg Oral Given 6/8/24 1457)   tetracaine 0.5 % ophthalmic solution 1 drop (1 drop Left Eye Given by Other 6/8/24 1604)   fluorescein sodium sterile ophthalmic strip 1 strip (1 strip Left Eye Given by Other 6/8/24 1604)   erythromycin (ILOTYCIN) 0.5 % ophthalmic ointment 0.5 inch (0.5 inches Left Eye Given 6/8/24 1629)       Diagnostic Studies  Results Reviewed       Procedure Component Value Units Date/Time    RBC Morphology Reflex Test [[id number]] Collected: 06/08/24 1416    Lab Status: Final result Specimen: Blood Updated: 06/08/24 1601    Lyme Total AB W Reflex to IGM/IGG [[id number]] Collected: 06/08/24 1445    Lab Status: In process Specimen: Blood from Arm, Left Updated: 06/08/24 1536    Narrative:      The following orders were created for panel order Lyme Total AB W Reflex to IGM/IGG.  Procedure                               Abnormality         Status                     ---------                               -----------         ------                     Lyme Total AB W Reflex t...[[id number]]                      In process                   Please view results for these tests on the individual orders.    Lyme Total AB W Reflex to IGM/IGG [[id number]] Collected: 06/08/24 1445    Lab Status: In process Specimen: Blood from Arm, Left Updated: 06/08/24 1536    CBC and differential [[id number]]  (Abnormal) Collected: 06/08/24 1416    Lab Status: Final result Specimen: Blood Updated: 06/08/24 1525     WBC 22.35 Thousand/uL      RBC 4.50 Million/uL      Hemoglobin 12.9 g/dL      Hematocrit 37.9 %      MCV 84 fL      MCH 28.7 pg      MCHC 34.0 g/dL      RDW 13.0 %      MPV 8.7 fL      Platelets 495 Thousands/uL     Narrative:      This is an appended report.  These results have been appended to a previously verified report.    Manual Differential(PHLEBS Do  Not Order) [[id number]]  (Abnormal) Collected: 06/08/24 1416    Lab Status: Final result Specimen: Blood Updated: 06/08/24 1525     Segmented % 94 %      Bands % 1 %      Lymphocytes % 4 %      Monocytes % 0 %      Eosinophils % 0 %      Basophils % 0 %      Metamyelocytes % 1 %      Absolute Neutrophils 21.23 Thousand/uL      Absolute Lymphocytes 0.89 Thousand/uL      Absolute Monocytes 0.00 Thousand/uL      Absolute Eosinophils 0.00 Thousand/uL      Absolute Basophils 0.00 Thousand/uL      Absolute Metamyelocytes 0.22 Thousand/uL      Total Counted --     Toxic Granulation Present     Toxic Vacuolization Present     RBC Morphology Normal     Platelet Estimate Increased    Comprehensive metabolic panel [[id number]]  (Abnormal) Collected: 06/08/24 1416    Lab Status: Final result Specimen: Blood Updated: 06/08/24 1444     Sodium 126 mmol/L      Potassium 3.8 mmol/L      Chloride 90 mmol/L      CO2 29 mmol/L      ANION GAP 7 mmol/L      BUN 24 mg/dL      Creatinine 0.99 mg/dL      Glucose 183 mg/dL      Calcium 9.2 mg/dL      AST 14 U/L      ALT 15 U/L      Alkaline Phosphatase 56 U/L      Total Protein 7.0 g/dL      Albumin 3.6 g/dL      Total Bilirubin 0.88 mg/dL      eGFR 59 ml/min/1.73sq m     Narrative:      National Kid[employer] Foundation guidelines for Chronic Kidney Disease (CKD):     Stage 1 with normal or high GFR (GFR > 90 mL/min/1.73 square meters)    Stage 2 Mild CKD (GFR = 60-89 mL/min/1.73 square meters)    Stage 3A Moderate CKD (GFR = 45-59 mL/min/1.73 square meters)    Stage 3B Moderate CKD (GFR = 30-44 mL/min/1.73 square meters)    Stage 4 Severe CKD (GFR = 15-29 mL/min/1.73 square meters)    Stage 5 End Stage CKD (GFR <15 mL/min/1.73 square meters)  Note: GFR calculation is accurate only with a steady state creatinine                   No orders to display              Procedures  Procedures         ED Course  ED Course as of 06/08/24 1839   Sat Jun 08, 2024   1438 WBC(!): 22.35  On steroid burst                                SBIRT 20yo+      Flowsheet Row Most Recent Value   Initial Alcohol Screen: US AUDIT-C     1. How often do you have a drink containing alcohol? 0 Filed at: 06/08/2024 1411   2. How many drinks containing alcohol do you have on a typical day you are drinking?  0 Filed at: 06/08/2024 1411   3a. Male UNDER 65: How often do you have five or more drinks on one occasion? 0 Filed at: 06/08/2024 1411   3b. FEMALE Any Age, or MALE 65+: How often do you have 4 or more drinks on one occassion? 0 Filed at: 06/08/2024 1411   Audit-C Score 0 Filed at: 06/08/2024 1411   HARMEET: How many times in the past year have you...    Used an illegal drug or used a prescription medication for non-medical reasons? Never Filed at: 06/08/2024 1411                      Medical Decision Making  DDx including but not limited to:  Bell's palsy, Lyme disease, viral infections (including herpes zoster); considered but doubt TIA or CVA    First nurse orders placed including CMP and CBC.  Patient found to be hyponatremic with elevated glucose to 183.  No metabolic shift or transaminitis.  White blood cell count of 22.35 which may be secondary to recent high-dose steroids.  Patient without recent illnesses, fevers, or chills.  Physical exam consistent with Bell's palsy given otherwise normal neurologic exam with flattening of the left-sided forehead folds and left lower face.  She has incomplete closure of the left eye for which fluorescein stain completed.  Corneal abrasion noted to the 6 to 7 o'clock position of the cornea.  No other fluorescein uptake including dendritic appearance to suggest herpes.  No vesicular rash of ear canals to suggest Man Montel[place] or herpes zoster.  Visual acuity is impaired out of left eye, however the eye is dry and she has been unable to fully close it.  Remainder of head to physical exam reveals mildly erythematous skin lesions consistent with erythema migrans.  Suspicion raised for Lyme  disease.  Will start treatment with doxycycline while setting of Lyme titer.  I discussed the lab findings with the patient and she does report drinking lots of fluid to stay hydrated as she has been eating less due to the severe low back pain.  Suspect this might be dilutional hyponatremia.  I offered her admission for trending of this lab value, however she otherwise reports feeling well denies headaches and paresthesias.  She prefers to go home with close follow-up.  Will place order for referral to neurology, start patient on high-dose steroids with taper, doxycycline for suspected Lyme disease, close follow-up with ophthalmology, and follow-up with comprehensive spine program for acute right-sided low back pain with radiculopathy, and strict return precautions.  Patient overall appears well, in no acute distress, and is ambulatory with steady gait at time discharge.    Problems Addressed:  Acute right-sided low back pain with right-sided sciatica: acute illness or injury  Bell's palsy: acute illness or injury  Left corneal abrasion, initial encounter: acute illness or injury    Amount and/or Complexity of Data Reviewed  Labs: ordered. Decision-making details documented in ED Course.    Risk  OTC drugs.  Prescription drug management.             Disposition  Final diagnoses:   Bell's palsy   Acute right-sided low back pain with right-sided sciatica   Left corneal abrasion, initial encounter     Time reflects when diagnosis was documented in both MDM as applicable and the Disposition within this note       Time User Action Codes Description Comment    6/8/2024  3:35 PM Ana Barnes Add [G51.0] Bell's palsy     6/8/2024  3:36 PM Ana Barnes Add [M54.41] Acute right-sided low back pain with right-sided sciatica     6/8/2024  4:17 PM Ana Barnes Add [S05.02XA] Left corneal abrasion, initial encounter           ED Disposition       ED Disposition   Discharge    Condition   Stable    Date/Time   Sat Jun 8, 2024  1535    Comment   Jerardo Sanon discharge to home/self care.                   Follow-up Information       Follow up With Specialties Details Why Contact Info Additional Information    Rebeca Obregon PA-C Family Medicine, Physician Assistant Call on 6/10/2024  [address], [address]  [facility] [address] [phone]        [facility] Emergency Medicine Go to  If symptoms worsen [address] [facility] [address] [phone] [facility], [address], [place] [address]            Discharge Medication List as of 6/8/2024  4:33 PM        START taking these medications    Details   doxycycline hyclate (VIBRAMYCIN) 100 mg capsule Take 1 capsule (100 mg total) by mouth 2 (two) times a day for 14 days, Starting Sat 6/8/2024, Until Sat 6/22/2024, Normal      erythromycin (ILOTYCIN) ophthalmic ointment Administer into the left eye 4 (four) times a day for 5 days Place a 1/2 inch ribbon of ointment into the lower eyelid., Starting Sat 6/8/2024, Until Thu 6/13/2024, Normal      White Petrolatum-Mineral Oil (artificial tears) Administer into the left eye as needed for dry eyes for up to 14 days, Starting Sat 6/8/2024, Until Sat 6/22/2024 at 2359, Normal           CONTINUE these medications which have CHANGED    Details   predniSONE 20 mg tablet Multiple Dosages:Starting Sat 6/8/2024, Until Wed 6/12/2024 at 2359, THEN Starting Thu 6/13/2024, Until Sat 6/15/2024 at 2359, THEN Starting Sun 6/16/2024, Until Tue 6/18/2024 at 2359, THEN Starting Wed 6/19/2024, Until Fri 6/21/2024 at 2359Take 3 t ablets (60 mg total) by mouth daily for 5 days, THEN 2 tablets (40 mg total) daily for 3 days, THEN 1 tablet (20 mg total) daily for 3 days, THEN 0.5 tablets (10 mg total) daily for 3 days., Normal           CONTINUE these medications which have NOT CHANGED    Details   Ascorbic Acid (VITAMIN  C) 500 MG/5ML LIQD Take 1 tablet by mouth daily, Historical Med      b complex vitamins capsule Take 1 capsule by mouth daily, Historical Med      Biotin 10 MG CAPS Take by mouth daily, Historical Med      calcium citrate-vitamin D (CITRACAL+D) 315-200 MG-UNIT per tablet Take 1 tablet by mouth daily, Historical Med      Cholecalciferol (VITAMIN D3) 1000 UNIT/SPRAY LIQD Take 1 capsule by mouth daily, Historical Med      diazepam (VALIUM) 5 mg tablet Take 1 tablet (5 mg total) by mouth daily at bedtime as needed for sleep or muscle spasms, Starting Mon 6/3/2024, Normal      EPINEPHrine (EPIPEN) 0.3 mg/0.3 mL SOAJ Inject 0.3 mL (0.3 mg total) into a muscle once for 1 dose, Starting Fri 7/28/2023, Normal      fenofibrate (TRICOR) 145 mg tablet take 1 tablet by mouth once daily, Starting Sat 5/18/2024, Normal      levothyroxine 88 mcg tablet take 1 tablet by mouth once daily, Starting Sat 5/18/2024, Normal      Multiple Vitamins-Minerals (MULTI-VITAMIN/MINERALS PO) Take 1 tablet by mouth daily, Historical Med      Omega-3 Fatty Acids (OMEGA-3 FISH OIL) 1000 MG CAPS Take 1 capsule by mouth daily, Historical Med      propranolol (INDERAL) 40 mg tablet take 30 MINUTES prior to procedure as directed by prescriber, Normal      vitamin E 400 UNIT/15ML LIQD Take 1 capsule by mouth daily, Historical Med                 PDMP Review         Value Time User    PDMP Reviewed  Yes 6/3/2024  9:03 AM Rebeca Obregon PA-C            ED Provider  Electronically Signed by             PIO Montes  06/08/24 1454

## 2024-06-09 ENCOUNTER — NURSE TRIAGE (OUTPATIENT)
Dept: OTHER | Facility: OTHER | Age: 64
End: 2024-06-09

## 2024-06-09 LAB
B BURGDOR IGG SERPL QL IA: POSITIVE
B BURGDOR IGG+IGM SER QL IA: POSITIVE
B BURGDOR IGM SERPL QL IA: POSITIVE

## 2024-06-09 NOTE — TELEPHONE ENCOUNTER
"Regarding: req f/u appt w/pcp for ER visit. face paralysis  ----- Message from Pretty ANTHONY sent at 6/9/2024 10:33 AM EDT -----  \"I would like to see if my wife can be seen Monday or Tuesday. She has face paralysis that we are concerned about. She had covid on 5/8 and then a relapse and since then hasn't been well. She isn't sleeping or eating and when she does eat she vomits. The kids came over and the side of her face was droopy so they took her to the urgent care who sent her to the ER and they ruled out a stroke and did blood work and put her on an antibiotic in case it's lyme but I'd really like her to be seen\"    "

## 2024-06-10 ENCOUNTER — TELEPHONE (OUTPATIENT)
Dept: PHYSICAL THERAPY | Facility: OTHER | Age: 64
End: 2024-06-10

## 2024-06-10 ENCOUNTER — OFFICE VISIT (OUTPATIENT)
Dept: FAMILY MEDICINE CLINIC | Facility: CLINIC | Age: 64
End: 2024-06-10
Payer: MEDICARE

## 2024-06-10 VITALS
RESPIRATION RATE: 16 BRPM | WEIGHT: 164 LBS | HEIGHT: 67 IN | HEART RATE: 68 BPM | DIASTOLIC BLOOD PRESSURE: 80 MMHG | TEMPERATURE: 98 F | BODY MASS INDEX: 25.74 KG/M2 | SYSTOLIC BLOOD PRESSURE: 118 MMHG | OXYGEN SATURATION: 98 %

## 2024-06-10 DIAGNOSIS — G51.0 BELL'S PALSY: Primary | ICD-10-CM

## 2024-06-10 DIAGNOSIS — M54.50 LOW BACK PAIN RADIATING TO RIGHT LEG: ICD-10-CM

## 2024-06-10 DIAGNOSIS — M79.604 LOW BACK PAIN RADIATING TO RIGHT LEG: ICD-10-CM

## 2024-06-10 PROCEDURE — 99214 OFFICE O/P EST MOD 30 MIN: CPT | Performed by: PHYSICIAN ASSISTANT

## 2024-06-10 PROCEDURE — G2211 COMPLEX E/M VISIT ADD ON: HCPCS | Performed by: PHYSICIAN ASSISTANT

## 2024-06-10 NOTE — PROGRESS NOTES
Assessment/Plan:    Lyme disease - finish 14 days of doxy as prescribed  Ventura Palsy - due to #1, finish prednisone as prescribed, patient would like ot see neuro for this  Left eye pain - possible corneal abrasion per ER, on e-mycin, taping eye closed, bout patches, refer to Dr Holland  Lumbar radiculopathy - possible due to Lyme, will do XR lumbar spine and refer to PT    F/u as needed    Subjective:   Chief Complaint   Patient presents with    Facial Droop     Pt seen in ER on 6/8 with right side facial droop   ER ruled out stroke and diagnosed as Bell's Palsy secondary to Lyme      Lyme Disease     Confirmed by blood work in ER  Pt is taking prednisone, doxycycline, and erythromycin       Patient ID: Jerardo Sanon is a 64 y.o. female.    The patient here in follow up with  from ER evaluation on June 8, went to ER because 2 to 3 days of left-sided facial droop.  The patient notes on Wednesday having flattening of her left forehead.  She notes over the past 2 to 3 days having flattening/drooping of the entire left side of her face.  She denies associated headaches.  She notes the left eyelid does not fully close and she does have some blurring of the vision of the left eye.  She was recently seen by telehealth appointment 5 days ago due to severe right lower back pain that radiates down the right leg.  She was started on prednisone taper and as needed Valium.  She notes her low back pain has notably improved. Stroke was ruled out and patient tested positive for Lyme. Was put on doxy and prednisone and discharged. Patient very concerned about recent health issues.        The following portions of the patient's history were reviewed and updated as appropriate: allergies, current medications, past family history, past medical history, past social history, past surgical history, and problem list.    Past Medical History:   Diagnosis Date    Abnormal liver function test     Last Assessed: 7/26/2012    Diffuse  nontoxic goiter     Elevated blood pressure reading     Last Assessed: 1/29/2013    Hematuria     Nonspecific abnormal results of function study of thyroid     Lsat Assessed: 7/26/2012    Seborrhea     Seborrheic keratosis     Last Assessed: 2/6/2013     History reviewed. No pertinent surgical history.  Family History   Problem Relation Age of Onset    Heart disease Mother     Heart defect Mother     Hypertension Father     Diabetes type II Father     No Known Problems Daughter     No Known Problems Maternal Grandmother     No Known Problems Maternal Grandfather     No Known Problems Paternal Grandmother     No Known Problems Paternal Grandfather     No Known Problems Brother     No Known Problems Maternal Aunt     No Known Problems Maternal Aunt     No Known Problems Paternal Aunt     No Known Problems Paternal Aunt     Alcohol abuse Neg Hx     Substance Abuse Neg Hx     Mental illness Neg Hx      Social History     Socioeconomic History    Marital status: /Civil Union     Spouse name: Not on file    Number of children: Not on file    Years of education: Not on file    Highest education level: Not on file   Occupational History    Not on file   Tobacco Use    Smoking status: Former     Types: Cigarettes    Smokeless tobacco: Never    Tobacco comments:     1/4 of a pack a day   Vaping Use    Vaping status: Never Used   Substance and Sexual Activity    Alcohol use: Yes     Comment: social - 1 glass of wine a month    Drug use: No    Sexual activity: Not on file   Other Topics Concern    Not on file   Social History Narrative    Uses safety equipment - seatbelts     Social Determinants of Health     Financial Resource Strain: Not on file   Food Insecurity: Not on file   Transportation Needs: Not on file   Physical Activity: Not on file   Stress: Not on file   Social Connections: Not on file   Intimate Partner Violence: Not on file   Housing Stability: Not on file       Current Outpatient Medications:      Ascorbic Acid (VITAMIN C) 500 MG/5ML LIQD, Take 1 tablet by mouth daily, Disp: , Rfl:     b complex vitamins capsule, Take 1 capsule by mouth daily, Disp: , Rfl:     Biotin 10 MG CAPS, Take by mouth daily, Disp: , Rfl:     calcium citrate-vitamin D (CITRACAL+D) 315-200 MG-UNIT per tablet, Take 1 tablet by mouth daily, Disp: , Rfl:     Cholecalciferol (VITAMIN D3) 1000 UNIT/SPRAY LIQD, Take 1 capsule by mouth daily, Disp: , Rfl:     diazepam (VALIUM) 5 mg tablet, Take 1 tablet (5 mg total) by mouth daily at bedtime as needed for sleep or muscle spasms, Disp: 10 tablet, Rfl: 0    doxycycline hyclate (VIBRAMYCIN) 100 mg capsule, Take 1 capsule (100 mg total) by mouth 2 (two) times a day for 14 days, Disp: 28 capsule, Rfl: 0    erythromycin (ILOTYCIN) ophthalmic ointment, Administer into the left eye 4 (four) times a day for 5 days Place a 1/2 inch ribbon of ointment into the lower eyelid., Disp: 3.5 g, Rfl: 0    fenofibrate (TRICOR) 145 mg tablet, take 1 tablet by mouth once daily, Disp: 90 tablet, Rfl: 1    levothyroxine 88 mcg tablet, take 1 tablet by mouth once daily, Disp: 90 tablet, Rfl: 1    Multiple Vitamins-Minerals (MULTI-VITAMIN/MINERALS PO), Take 1 tablet by mouth daily, Disp: , Rfl:     Omega-3 Fatty Acids (OMEGA-3 FISH OIL) 1000 MG CAPS, Take 1 capsule by mouth daily, Disp: , Rfl:     predniSONE 20 mg tablet, Take 3 tablets (60 mg total) by mouth daily for 5 days, THEN 2 tablets (40 mg total) daily for 3 days, THEN 1 tablet (20 mg total) daily for 3 days, THEN 0.5 tablets (10 mg total) daily for 3 days., Disp: 26 tablet, Rfl: 0    vitamin E 400 UNIT/15ML LIQD, Take 1 capsule by mouth daily, Disp: , Rfl:     White Petrolatum-Mineral Oil (artificial tears), Administer into the left eye as needed for dry eyes for up to 14 days, Disp: 3.5 g, Rfl: 0    EPINEPHrine (EPIPEN) 0.3 mg/0.3 mL SOAJ, Inject 0.3 mL (0.3 mg total) into a muscle once for 1 dose, Disp: 0.6 mL, Rfl: 3    propranolol (INDERAL) 40 mg tablet,  "take 30 MINUTES prior to procedure as directed by prescriber (Patient not taking: Reported on 5/6/2024), Disp: 20 tablet, Rfl: 0    Review of Systems          Objective:    Vitals:    06/10/24 1307   BP: 118/80   Pulse: 68   Resp: 16   Temp: 98 °F (36.7 °C)   TempSrc: Temporal   SpO2: 98%   Weight: 74.4 kg (164 lb)   Height: 5' 6.5\" (1.689 m)        Physical Exam  Constitutional:       Appearance: Normal appearance.   HENT:      Head: Normocephalic and atraumatic.   Musculoskeletal:         General: Normal range of motion.   Skin:     General: Skin is warm.   Neurological:      General: No focal deficit present.      Mental Status: She is alert and oriented to person, place, and time.      Cranial Nerves: Cranial nerve deficit present.      Motor: No weakness.      Coordination: Coordination normal.      Gait: Gait normal.      Deep Tendon Reflexes: Reflexes normal.      Comments: Left 7th nerve palsy, left eye not closing, facial droop   Psychiatric:         Mood and Affect: Mood normal.         Behavior: Behavior normal.         Thought Content: Thought content normal.         Judgment: Judgment normal.               "

## 2024-06-11 NOTE — TELEPHONE ENCOUNTER
Patient called into [facility] today regarding her referral due to back pain and sciatica.    Spoke with patient, explained program and protocol.    I also made her aware she has a DIRECT PT REFERRAL. No need to triage.    [place] phone number and address provided and I also transferred call to PT site for further assistance with scheduling her appt.    [facility] referral closed per protocol. (Duplicate).

## 2024-06-12 ENCOUNTER — HOSPITAL ENCOUNTER (OUTPATIENT)
Dept: RADIOLOGY | Facility: HOSPITAL | Age: 64
Discharge: HOME | End: 2024-06-12
Payer: MEDICARE

## 2024-06-12 ENCOUNTER — APPOINTMENT (OUTPATIENT)
Dept: LAB | Facility: HOSPITAL | Age: 64
End: 2024-06-12
Payer: MEDICARE

## 2024-06-12 ENCOUNTER — TELEPHONE (OUTPATIENT)
Age: 64
End: 2024-06-12

## 2024-06-12 DIAGNOSIS — M79.604 LOW BACK PAIN RADIATING TO RIGHT LEG: ICD-10-CM

## 2024-06-12 DIAGNOSIS — Z00.6 ENCOUNTER FOR EXAMINATION FOR NORMAL COMPARISON OR CONTROL IN CLINICAL RESEARCH PROGRAM: ICD-10-CM

## 2024-06-12 DIAGNOSIS — M54.50 LOW BACK PAIN RADIATING TO RIGHT LEG: ICD-10-CM

## 2024-06-12 PROCEDURE — 72110 X-RAY EXAM L-2 SPINE 4/>VWS: CPT

## 2024-06-12 PROCEDURE — 36415 COLL VENOUS BLD VENIPUNCTURE: CPT

## 2024-06-12 NOTE — TELEPHONE ENCOUNTER
Patient called to get the phone number to schedule with [facility]. Provided [phone]

## 2024-06-17 ENCOUNTER — PATIENT MESSAGE (OUTPATIENT)
Dept: FAMILY MEDICINE CLINIC | Facility: CLINIC | Age: 64
End: 2024-06-17

## 2024-06-17 ENCOUNTER — NURSE TRIAGE (OUTPATIENT)
Age: 64
End: 2024-06-17

## 2024-06-17 NOTE — TELEPHONE ENCOUNTER
"Pt called. Reports right flank ache ongoing with slowed urine stream. Triage completed. Office appointment made for tomorrow morning as Pt unavailable today. Pt agreeable with plan and denies further needs at this time.    Reason for Disposition   MILD pain (i.e., scale 1-3; does not interfere with normal activities) and present > 3 days    Answer Assessment - Initial Assessment Questions  1. LOCATION: \"Where does it hurt?\" (e.g., left, right)      Right mid lower back/flank  2. ONSET: \"When did the pain start?\"      Ongoing, worse in the morning  3. SEVERITY: \"How bad is the pain?\" (e.g., Scale 1-10; mild, moderate, or severe)    - MILD (1-3): doesn't interfere with normal activities     - MODERATE (4-7): interferes with normal activities or awakens from sleep     - SEVERE (8-10): excruciating pain and patient unable to do normal activities (stays in bed)        4/10-5/10 ache  4. PATTERN: \"Does the pain come and go, or is it constant?\"       continuous  5. CAUSE: \"What do you think is causing the pain?\"      Unsure if related to sciatica or Lymes  6. OTHER SYMPTOMS:  \"Do you have any other symptoms?\" (e.g., fever, abdominal pain, vomiting, leg weakness, burning with urination, blood in urine)      Urine flow is slower denies other symptoms including pain, odor, change in color, and frequency. tolerating PO fluids well, no fever  7. PREGNANCY:  \"Is there any chance you are pregnant?\" \"When was your last menstrual period?\"      N/a    Protocols used: Flank Pain-ADULT-[place]    "

## 2024-06-18 ENCOUNTER — TELEPHONE (OUTPATIENT)
Dept: FAMILY MEDICINE CLINIC | Facility: CLINIC | Age: 64
End: 2024-06-18

## 2024-06-18 ENCOUNTER — EVALUATION (OUTPATIENT)
Dept: PHYSICAL THERAPY | Facility: CLINIC | Age: 64
End: 2024-06-18
Payer: MEDICARE

## 2024-06-18 ENCOUNTER — APPOINTMENT (OUTPATIENT)
Dept: RADIOLOGY | Facility: CLINIC | Age: 64
End: 2024-06-18
Payer: MEDICARE

## 2024-06-18 ENCOUNTER — OFFICE VISIT (OUTPATIENT)
Dept: FAMILY MEDICINE CLINIC | Facility: CLINIC | Age: 64
End: 2024-06-18
Payer: MEDICARE

## 2024-06-18 VITALS
DIASTOLIC BLOOD PRESSURE: 90 MMHG | HEIGHT: 67 IN | WEIGHT: 168.6 LBS | OXYGEN SATURATION: 99 % | TEMPERATURE: 94.5 F | HEART RATE: 54 BPM | SYSTOLIC BLOOD PRESSURE: 130 MMHG | RESPIRATION RATE: 16 BRPM | BODY MASS INDEX: 26.46 KG/M2

## 2024-06-18 DIAGNOSIS — M79.604 LOW BACK PAIN RADIATING TO RIGHT LEG: ICD-10-CM

## 2024-06-18 DIAGNOSIS — M54.50 LOW BACK PAIN RADIATING TO RIGHT LEG: ICD-10-CM

## 2024-06-18 DIAGNOSIS — R10.9 RIGHT FLANK PAIN: Primary | ICD-10-CM

## 2024-06-18 DIAGNOSIS — R10.9 RIGHT FLANK PAIN: ICD-10-CM

## 2024-06-18 DIAGNOSIS — R39.9 URINARY SYMPTOM OR SIGN: ICD-10-CM

## 2024-06-18 LAB
BILIRUB UR QL STRIP: NEGATIVE
CLARITY UR: CLEAR
COLOR UR: NORMAL
GLUCOSE UR STRIP-MCNC: NEGATIVE MG/DL
HGB UR QL STRIP.AUTO: NEGATIVE
KETONES UR STRIP-MCNC: NEGATIVE MG/DL
LEUKOCYTE ESTERASE UR QL STRIP: NEGATIVE
NITRITE UR QL STRIP: NEGATIVE
PH UR STRIP.AUTO: 6.5 [PH]
PROT UR STRIP-MCNC: NEGATIVE MG/DL
SL AMB  POCT GLUCOSE, UA: NEGATIVE
SL AMB LEUKOCYTE ESTERASE,UA: NEGATIVE
SL AMB POCT BILIRUBIN,UA: NEGATIVE
SL AMB POCT BLOOD,UA: NEGATIVE
SL AMB POCT CLARITY,UA: CLEAR
SL AMB POCT COLOR,UA: YELLOW
SL AMB POCT KETONES,UA: NEGATIVE
SL AMB POCT NITRITE,UA: NEGATIVE
SL AMB POCT PH,UA: 6
SL AMB POCT SPECIFIC GRAVITY,UA: 1
SL AMB POCT URINE PROTEIN: NEGATIVE
SL AMB POCT UROBILINOGEN: NORMAL
SP GR UR STRIP.AUTO: 1.01 (ref 1–1.03)
UROBILINOGEN UR STRIP-ACNC: <2 MG/DL

## 2024-06-18 PROCEDURE — 74018 RADEX ABDOMEN 1 VIEW: CPT

## 2024-06-18 PROCEDURE — 81002 URINALYSIS NONAUTO W/O SCOPE: CPT | Performed by: STUDENT IN AN ORGANIZED HEALTH CARE EDUCATION/TRAINING PROGRAM

## 2024-06-18 PROCEDURE — 97161 PT EVAL LOW COMPLEX 20 MIN: CPT

## 2024-06-18 PROCEDURE — 99213 OFFICE O/P EST LOW 20 MIN: CPT | Performed by: STUDENT IN AN ORGANIZED HEALTH CARE EDUCATION/TRAINING PROGRAM

## 2024-06-18 PROCEDURE — 97110 THERAPEUTIC EXERCISES: CPT

## 2024-06-18 PROCEDURE — 87086 URINE CULTURE/COLONY COUNT: CPT | Performed by: STUDENT IN AN ORGANIZED HEALTH CARE EDUCATION/TRAINING PROGRAM

## 2024-06-18 PROCEDURE — 81003 URINALYSIS AUTO W/O SCOPE: CPT | Performed by: STUDENT IN AN ORGANIZED HEALTH CARE EDUCATION/TRAINING PROGRAM

## 2024-06-18 PROCEDURE — G2211 COMPLEX E/M VISIT ADD ON: HCPCS | Performed by: STUDENT IN AN ORGANIZED HEALTH CARE EDUCATION/TRAINING PROGRAM

## 2024-06-18 NOTE — PROGRESS NOTES
PT Evaluation     Today's date: 2024  Patient name: Jerardo Sanon  : 1960  MRN: [id number]  Referring provider: Rebeca Obregon P*  Dx:   Encounter Diagnosis     ICD-10-CM    1. Low back pain radiating to right leg  M54.50 Ambulatory Referral to Physical Therapy    M79.604                      Assessment  Impairments: abnormal gait, abnormal or restricted ROM, activity intolerance, impaired physical strength, lacks appropriate home exercise program, pain with function and poor body mechanics    Assessment details: Jerardo Sanon is a pleasant 64 y.o. female who presents with R sided LBP with R sided radiating pain/parasthesias beginning about a month ago. She presents with mildly limited lumbar ROM and mildly decreased R shoulder strength. Note (+) slump on R. These impairments are limiting her with walking 1-2 miles each day, gardening, sleeping, and prolonged sitting. hese signs and symptoms are consistent with Low back pain radiating to right leg. She will benefit from skilled PT to address impairments and return to PLOF. No referral is necessary        Prognosis details: Positive prognostic indicators include positive attitude toward recovery, motivated to improve, high self-efficacy, good understanding of condition, realistic expectations.  Negative prognostic indicators include high symptom irritability    Goals  STG to be achieved in 4 weeks  Patient will have improved lumbar ROM in all planes to WNLs   Patient will have improved gross BLE strength to 5/5  Able to walk 1 mile each day  Patient will be independent with HEP.    LTG to be achieved in 8 weeks  Patient will be able to walk 2 miles   Patient will be able to ascend/descend stairs  Patient will be able to return to gardening      Plan  Patient would benefit from: skilled physical therapy  Planned modality interventions: cryotherapy and thermotherapy: hydrocollator packs    Planned therapy interventions: balance, home exercise program,  gait training, functional ROM exercises, body mechanics training, joint mobilization, manual therapy, neuromuscular re-education, therapeutic exercise, therapeutic activities, stretching, strengthening, flexibility and patient/caregiver education    Frequency: 2x week  Plan of Care beginning date: 2024  Plan of Care expiration date: 2024  Treatment plan discussed with: patient and PTA        Subjective Evaluation    History of Present Illness  Mechanism of injury: Patient reports R LBP and R buttock pain beginning about 3 weeks ago. About 2 weeks ago she had facial droop and went to ED - the assessed her to rule out stroke. She was diagnosed with Bell's Palsy and Lyme Disease.     Her pain is better with walking. Notes sitting bothers her. Lying down relieves pain. Is having trouble with her balance due to the eye from Bell's Palsy. Pain wakes her up at night. Notes increased pain with stairs and feels her R leg is weaker. She does try stretching by bending forward. She does get numbness into her R leg - posterior lateral thigh into R lower leg that is worse in the morning and gets better with movement    On average she walks about 7,000 steps a day.     Notes she used to walk 1-2 miles a day - would like to get back to do this. Notes she did lose 10 pounds. She wants to get back to gardening.       Patient Goals  Patient goal: Patient wants to be able to returning to walking, build up her endurance, return to gardening  Pain  Current pain ratin  At best pain ratin  At worst pain ratin  Relieving factors: heat, ice and medications        Objective     Active Range of Motion     Lumbar   Flexion:  Restriction level: minimal  Extension:  Restriction level: minimal    Additional Active Range of Motion Details  Bilateral sideglide: WNLs, painfree    Strength/Myotome Testing     Left Hip   Planes of Motion   Flexion: 4+    Right Hip   Planes of Motion   Flexion: 4-    Left Knee   Flexion:  4+  Extension: 5    Right Knee   Flexion: 4+  Extension: 5    Left Ankle/Foot   Dorsiflexion: 5  Inversion: 5    Right Ankle/Foot   Dorsiflexion: 4-  Inversion: 5    Tests     Lumbar     Right   Positive slump test.            Precautions: Bell's Palsy, Lyme Disease  Functional Limitations: prolonged sitting, walking 1-2 miles at brisk pace, gardening, stairs  Impairments: Lumbar ROM, RLE strength  [facility] Code: [id number]   POC expiration: 8/13/2024      Manuals 6/18            R lumbar and glute med STM nv            Prone lumbar PA mobs nv            L sidelying R rot grade 2/3 mobs nv                         Neuro Re-Ed             Seated sciatic nerve glide nv            Hooklying TA nv            Hooklying march nv                                                                Ther Ex             bike nv            Standing lumbar ext HEP            LTR HEP            Open book nv            Supine SLR nv            Sidelying hip abd nv                                                   Leg press nv            squats                          Ther Activity             Fwd step up                          Gait Training                                       Modalities

## 2024-06-18 NOTE — PROGRESS NOTES
Ambulatory Visit  Name: Jerardo Sanon      : 1960      MRN: [id number]  Encounter Provider: Luz Fair MD  Encounter Date: 2024   Encounter department: [facility]    Assessment & Plan   1. Right flank pain  -     XR abdomen 1 view kub; Future; Expected date: 2024  2. Urinary symptom or sign  -     POCT urine dip  -     Urine culture  -     UA (URINE) with reflex to Scope    Urine dip in office is normal, negative leukocytes nitrites blood  Discussed with patient will send for UA and culture to evaluate and if positive will start patient on antibiotic therapy  Patient to continue hydration     History of Present Illness     Jerardo is a 64-year-old female who presents to the office today for an acute visit.  Patient reports she was recently diagnosed with Lyme disease and treated with doxycycline.  Patient reports she noted a slower urinary stream along with some pain right sided back pain and want to make sure it is not her kidneys.  Patient denies any fever, discharge, change in odor or bleeding.      Review of Systems   Constitutional:  Negative for chills, fatigue and fever.   HENT:  Negative for congestion.    Respiratory:  Negative for cough and shortness of breath.    Cardiovascular:  Negative for chest pain.   Gastrointestinal:  Negative for abdominal pain, nausea and vomiting.   Genitourinary:  Positive for flank pain. Negative for dysuria, frequency and hematuria.   Neurological:  Negative for dizziness, light-headedness and headaches.     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 No pertinent surgical history.  Family History   Problem Relation Age of Onset    Heart disease Mother     Heart defect Mother     Hypertension Father     Diabetes type II Father     No Known Problems Daughter     No Known Problems Maternal Grandmother     No Known Problems Maternal Grandfather     No Known Problems Paternal Grandmother     No Known Problems Paternal Grandfather     No Known Problems Brother     No Known Problems Maternal Aunt     No Known Problems Maternal Aunt     No Known Problems Paternal Aunt     No Known Problems Paternal Aunt     Alcohol abuse Neg Hx     Substance Abuse Neg Hx     Mental illness Neg Hx      Current Outpatient Medications on File Prior to Visit   Medication Sig Dispense Refill    Ascorbic Acid (VITAMIN C) 500 MG/5ML LIQD Take 1 tablet by mouth daily      b complex vitamins capsule Take 1 capsule by mouth daily      Biotin 10 MG CAPS Take by mouth daily      calcium citrate-vitamin D (CITRACAL+D) 315-200 MG-UNIT per tablet Take 1 tablet by mouth daily      Cholecalciferol (VITAMIN D3) 1000 UNIT/SPRAY LIQD Take 1 capsule by mouth daily      doxycycline hyclate (VIBRAMYCIN) 100 mg capsule Take 1 capsule (100 mg total) by mouth 2 (two) times a day for 14 days 28 capsule 0    fenofibrate (TRICOR) 145 mg tablet take 1 tablet by mouth once daily 90 tablet 1    levothyroxine 88 mcg tablet take 1 tablet by mouth once daily 90 tablet 1    Multiple Vitamins-Minerals (MULTI-VITAMIN/MINERALS PO) Take 1 tablet by mouth daily      Omega-3 Fatty Acids (OMEGA-3 FISH OIL) 1000 MG CAPS Take 1 capsule by mouth daily      predniSONE 20 mg tablet Take 3 tablets (60 mg total) by mouth daily for 5 days, THEN 2 tablets (40 mg total) daily for 3 days, THEN 1 tablet (20 mg total) daily for 3 days, THEN 0.5 tablets (10 mg total) daily for 3 days. 26 tablet 0    vitamin E 400 UNIT/15ML LIQD Take 1 capsule  by mouth daily      White Petrolatum-Mineral Oil (artificial tears) Administer into the left eye as needed for dry eyes for up to 14 days 3.5 g 0    diazepam (VALIUM) 5 mg tablet Take 1 tablet (5 mg total) by mouth daily at bedtime as needed for sleep or muscle spasms (Patient not taking: Reported on 6/18/2024) 10 tablet 0    EPINEPHrine (EPIPEN) 0.3 mg/0.3 mL SOAJ Inject 0.3 mL (0.3 mg total) into a muscle once for 1 dose 0.6 mL 3    propranolol (INDERAL) 40 mg tablet take 30 MINUTES prior to procedure as directed by prescriber (Patient not taking: Reported on 5/6/2024) 20 tablet 0     No current facility-administered medications on file prior to visit.     Allergies   Allergen Reactions    Bee Venom Edema      Current Outpatient Medications on File Prior to Visit   Medication Sig Dispense Refill    Ascorbic Acid (VITAMIN C) 500 MG/5ML LIQD Take 1 tablet by mouth daily      b complex vitamins capsule Take 1 capsule by mouth daily      Biotin 10 MG CAPS Take by mouth daily      calcium citrate-vitamin D (CITRACAL+D) 315-200 MG-UNIT per tablet Take 1 tablet by mouth daily      Cholecalciferol (VITAMIN D3) 1000 UNIT/SPRAY LIQD Take 1 capsule by mouth daily      doxycycline hyclate (VIBRAMYCIN) 100 mg capsule Take 1 capsule (100 mg total) by mouth 2 (two) times a day for 14 days 28 capsule 0    fenofibrate (TRICOR) 145 mg tablet take 1 tablet by mouth once daily 90 tablet 1    levothyroxine 88 mcg tablet take 1 tablet by mouth once daily 90 tablet 1    Multiple Vitamins-Minerals (MULTI-VITAMIN/MINERALS PO) Take 1 tablet by mouth daily      Omega-3 Fatty Acids (OMEGA-3 FISH OIL) 1000 MG CAPS Take 1 capsule by mouth daily      predniSONE 20 mg tablet Take 3 tablets (60 mg total) by mouth daily for 5 days, THEN 2 tablets (40 mg total) daily for 3 days, THEN 1 tablet (20 mg total) daily for 3 days, THEN 0.5 tablets (10 mg total) daily for 3 days. 26 tablet 0    vitamin E 400 UNIT/15ML LIQD Take 1 capsule by mouth daily    "   White Petrolatum-Mineral Oil (artificial tears) Administer into the left eye as needed for dry eyes for up to 14 days 3.5 g 0    diazepam (VALIUM) 5 mg tablet Take 1 tablet (5 mg total) by mouth daily at bedtime as needed for sleep or muscle spasms (Patient not taking: Reported on 2024) 10 tablet 0    EPINEPHrine (EPIPEN) 0.3 mg/0.3 mL SOAJ Inject 0.3 mL (0.3 mg total) into a muscle once for 1 dose 0.6 mL 3    propranolol (INDERAL) 40 mg tablet take 30 MINUTES prior to procedure as directed by prescriber (Patient not taking: Reported on 2024) 20 tablet 0     No current facility-administered medications on file prior to visit.      Social History     Tobacco Use    Smoking status: Former     Current packs/day: 0.00     Average packs/day: 0.3 packs/day for 30.0 years (7.5 ttl pk-yrs)     Types: Cigarettes     Quit date: 2018     Years since quittin.8    Smokeless tobacco: Never    Tobacco comments:     quit   Vaping Use    Vaping status: Never Used   Substance and Sexual Activity    Alcohol use: Yes     Alcohol/week: 1.0 standard drink of alcohol     Types: 1 Glasses of wine per week     Comment: social - 1 glass of wine a month    Drug use: No    Sexual activity: Not Currently     Partners: Male     Birth control/protection: None     Objective     /90   Pulse (!) 54   Temp (!) 94.5 °F (34.7 °C)   Resp 16   Ht 5' 6.5\" (1.689 m)   Wt 76.5 kg (168 lb 9.6 oz)   SpO2 99%   BMI 26.81 kg/m²     Physical Exam  Vitals reviewed.   Constitutional:       General: She is not in acute distress.  HENT:      Head: Normocephalic and atraumatic.   Eyes:      Extraocular Movements: Extraocular movements intact.      Conjunctiva/sclera: Conjunctivae normal.   Cardiovascular:      Rate and Rhythm: Normal rate.      Pulses: Normal pulses.   Abdominal:      Palpations: Abdomen is soft.      Tenderness: There is no abdominal tenderness. There is no right CVA tenderness or left CVA tenderness.   Neurological: "      Mental Status: She is alert.       Administrative Statements   I have spent a total time of 20 minutes on 06/20/24 In caring for this patient including Instructions for management, Impressions, Documenting in the medical record, Reviewing / ordering tests, medicine, procedures  , and Obtaining or reviewing history  .

## 2024-06-19 LAB — BACTERIA UR CULT: NORMAL

## 2024-06-21 ENCOUNTER — TELEPHONE (OUTPATIENT)
Dept: NEUROLOGY | Facility: CLINIC | Age: 64
End: 2024-06-21

## 2024-06-21 NOTE — TELEPHONE ENCOUNTER
Patient scheduled 8/23/24 at 1:30P M wit Dr. Mercedes in [facility]; address provided and added to the wait list.

## 2024-06-27 ENCOUNTER — OFFICE VISIT (OUTPATIENT)
Dept: PHYSICAL THERAPY | Facility: CLINIC | Age: 64
End: 2024-06-27
Payer: MEDICARE

## 2024-06-27 DIAGNOSIS — M54.50 LOW BACK PAIN RADIATING TO RIGHT LEG: Primary | ICD-10-CM

## 2024-06-27 DIAGNOSIS — M79.604 LOW BACK PAIN RADIATING TO RIGHT LEG: Primary | ICD-10-CM

## 2024-06-27 PROCEDURE — 97110 THERAPEUTIC EXERCISES: CPT

## 2024-06-27 PROCEDURE — 97112 NEUROMUSCULAR REEDUCATION: CPT

## 2024-06-27 RX ORDER — GABAPENTIN 100 MG/1
100 CAPSULE ORAL 3 TIMES DAILY
Qty: 90 CAPSULE | Refills: 5 | Status: SHIPPED | OUTPATIENT
Start: 2024-06-27

## 2024-06-27 NOTE — PROGRESS NOTES
"Daily Note     Today's date: 2024  Patient name: Jerardo Sanon  : 1960  MRN: [id number]  Referring provider: Rebeca Obregon P*  Dx:   Encounter Diagnosis     ICD-10-CM    1. Low back pain radiating to right leg  M54.50     M79.604                      Subjective: Pt reports she is feeling better overall, the tension and knot in her back has resolved which helps during the day, but sciatic symptoms are still causing her to lose sleeping at night.       Objective: See treatment diary below      Assessment: Initiated PT POC today. Pt doing well with HEP so far which has provided some relief. Discussed different sleeping positions to put pt in a better neutral position with pillows. She tolerated TE's well with no adverse effects. Issued nerve glides to HEP.   Tolerated treatment well. Patient demonstrated fatigue post treatment, exhibited good technique with therapeutic exercises, and would benefit from continued PT      Plan: Continue per plan of care.  Progress treatment as tolerated.       Precautions: Bell's Palsy, Lyme Disease  Functional Limitations: prolonged sitting, walking 1-2 miles at brisk pace, gardening, stairs  Impairments: Lumbar ROM, RLE strength  [employer] Code: [id number]   POC expiration: 2024      Manuals            R lumbar and glute med STM nv np           Prone lumbar PA mobs nv            L sidelying R rot grade 2/3 mobs nv                         Neuro Re-Ed             Seated sciatic nerve glide nv 10x3           Supine 90/90 sciatic nerve glide  10x3           Hooklying TA nv 5\"x10           Hooklying march nv 10x2                                                               Ther Ex             bike nv 5 min           Standing lumbar ext HEP 10x2           LTR HEP 10x2           Open book nv 10x2 ea           Supine SLR nv 10x2           Sidelying hip abd nv 10x2                                                  Leg press nv            squats                     "      Ther Activity             Fwd step up                          Gait Training                                       Modalities

## 2024-07-01 ENCOUNTER — OFFICE VISIT (OUTPATIENT)
Dept: PHYSICAL THERAPY | Facility: CLINIC | Age: 64
End: 2024-07-01
Payer: MEDICARE

## 2024-07-01 DIAGNOSIS — M79.604 LOW BACK PAIN RADIATING TO RIGHT LEG: Primary | ICD-10-CM

## 2024-07-01 DIAGNOSIS — M54.50 LOW BACK PAIN RADIATING TO RIGHT LEG: Primary | ICD-10-CM

## 2024-07-01 PROCEDURE — 97112 NEUROMUSCULAR REEDUCATION: CPT

## 2024-07-01 PROCEDURE — 97110 THERAPEUTIC EXERCISES: CPT

## 2024-07-01 NOTE — PROGRESS NOTES
"Daily Note     Today's date: 2024  Patient name: Jerardo Sanon  : 1960  MRN: [id number]  Referring provider: Rebeca Obregon P*  Dx:   Encounter Diagnosis     ICD-10-CM    1. Low back pain radiating to right leg  M54.50     M79.604                      Subjective: Pt reports noting the nerve glide exercise has really helped her. She notes walking but feels her foot hits the ground hard. She thinks she did the nerve glide exercise too aggressively and now has a little more pain in the tailbone      Objective: See treatment diary below      Assessment: Patient tolerated treatment well overall. Reviewed supine 90/90 nerve glides and corrected technique to help reduce LBP. Added TR for dorsiflexion control for ambulation. Progress step height to 6 inches to progress strength to improve her ability to ascend steps. Will benefit from skilled PT to address impairments and return to PLOF      Plan: Continue per plan of care.  Progress treatment as tolerated.       Precautions: Bell's Palsy, Lyme Disease  Functional Limitations: prolonged sitting, walking 1-2 miles at brisk pace, gardening, stairs  Impairments: Lumbar ROM, RLE strength  [employer] Code: [email or web address]   POC expiration: 2024      Manuals  7          R lumbar and glute med STM nv np           Prone lumbar PA mobs nv            L sidelying R rot grade 2/3 mobs nv                         Neuro Re-Ed             Seated sciatic nerve glide nv 10x3 3x10          Supine 90/90 sciatic nerve glide  10x3 3x10          Hooklying TA nv 5\"x10 5\"x10          Hooklying march nv 10x2 2x10                                                              Ther Ex             bike nv 5 min 6 min          Standing lumbar ext HEP 10x2 nv          LTR HEP 10x2 10ea          Open book nv 10x2 ea 2x10 ea          Supine SLR nv 10x2 2x10          Sidelying hip abd nv 10x2 2x10          bridge   2x10          Seated TR   2x10                       Leg press nv  35# " "2x10          squats   nv                       Ther Activity             Fwd step up   4\" 2x10 6\"                      Gait Training                                       Modalities                                            "

## 2024-07-03 LAB
APOB+LDLR+PCSK9 GENE MUT ANL BLD/T: NOT DETECTED
BRCA1+BRCA2 DEL+DUP + FULL MUT ANL BLD/T: NOT DETECTED
MLH1+MSH2+MSH6+PMS2 GN DEL+DUP+FUL M: NOT DETECTED

## 2024-07-05 ENCOUNTER — OFFICE VISIT (OUTPATIENT)
Dept: PHYSICAL THERAPY | Facility: CLINIC | Age: 64
End: 2024-07-05
Payer: MEDICARE

## 2024-07-05 DIAGNOSIS — M79.604 LOW BACK PAIN RADIATING TO RIGHT LEG: Primary | ICD-10-CM

## 2024-07-05 DIAGNOSIS — M54.50 LOW BACK PAIN RADIATING TO RIGHT LEG: Primary | ICD-10-CM

## 2024-07-05 PROCEDURE — 97112 NEUROMUSCULAR REEDUCATION: CPT

## 2024-07-05 PROCEDURE — 97110 THERAPEUTIC EXERCISES: CPT

## 2024-07-05 NOTE — PROGRESS NOTES
"Daily Note     Today's date: 2024  Patient name: Jerardo Sanon  : 1960  MRN: [id number]  Referring provider: Rebeca Obregon P*  Dx:   Encounter Diagnosis     ICD-10-CM    1. Low back pain radiating to right leg  M54.50     M79.604                      Subjective: Pt reports having some ankle soreness but she feels it is more muscle soreness. She doesn't feel like her foot flops when walking and notes she walks more smoothly. She Notes she took herself off the gabapenton - last night was the first night she didn't take it and slept fine      Objective: See treatment diary below      Assessment: Patient tolerated treatment well overall. Observed improved active ankle ROM during seated toe raises. Increased step height to progress her ability to ascend stairs. Updated HEP. Will benefit from skilled PT to continue to progress as tolerated to return to PLOF      Plan: Continue per plan of care.  Progress treatment as tolerated.       Precautions: Bell's Palsy, Lyme Disease  Functional Limitations: prolonged sitting, walking 1-2 miles at brisk pace, gardening, stairs  Impairments: Lumbar ROM, RLE strength  [employer] Code: [email or web address]   POC expiration: 2024      Manuals  7/ 7/         R lumbar and glute med STM nv np           Prone lumbar PA mobs nv            L sidelying R rot grade 2/3 mobs nv                         Neuro Re-Ed             Seated sciatic nerve glide nv 10x3 3x10          Supine 90/90 sciatic nerve glide  10x3 3x10 10ea         Hooklying TA nv 5\"x10 5\"x10 5\"x10         Hooklying march nv 10x2 2x10 2x10                                                             Ther Ex             bike nv 5 min 6 min 6'         Standing lumbar ext HEP 10x2 nv          LTR HEP 10x2 10ea 15ea         Open book nv 10x2 ea 2x10 ea 10ea         Supine SLR nv 10x2 2x10 2x10         Sidelying hip abd nv 10x2 2x10 2x10         bridge   2x10 2x10         Seated TR   2x10 2x10         Standing " "gastroc strectch    2x20\" ea                      Leg press RLE nv  35# 2x10 35# 2x10         squats   nv 2x10                      Ther Activity             Fwd step up   4\" 2x10 6\"x15ea                      Gait Training                                       Modalities                                            "

## 2024-07-08 ENCOUNTER — OFFICE VISIT (OUTPATIENT)
Dept: PHYSICAL THERAPY | Facility: CLINIC | Age: 64
End: 2024-07-08
Payer: MEDICARE

## 2024-07-08 DIAGNOSIS — M54.50 LOW BACK PAIN RADIATING TO RIGHT LEG: Primary | ICD-10-CM

## 2024-07-08 DIAGNOSIS — M79.604 LOW BACK PAIN RADIATING TO RIGHT LEG: Primary | ICD-10-CM

## 2024-07-08 PROCEDURE — 97112 NEUROMUSCULAR REEDUCATION: CPT

## 2024-07-08 PROCEDURE — 97110 THERAPEUTIC EXERCISES: CPT

## 2024-07-08 NOTE — PROGRESS NOTES
"Daily Note     Today's date: 2024  Patient name: Jerardo Sanon  : 1960  MRN: [id number]  Referring provider: Rebeca Obregon P*  Dx:   Encounter Diagnosis     ICD-10-CM    1. Low back pain radiating to right leg  M54.50     M79.604                      Subjective: Pt reports sciatic pain is almost gone- only time she feels it now is when she tried sleeping on the effected R side. Has noticed improved foot strength as well.     Objective: See treatment diary below      Assessment: Patient tolerated treatment well. She continues to benefit from core stability and LE strengthening. Also Showing improved foot strength and control while ambulating- no longer feeling foot is dropping or dragging. Will benefit from skilled PT to continue to progress as tolerated to return to PLOF      Plan: Continue per plan of care.  Progress treatment as tolerated.       Precautions: Bell's Palsy, Lyme Disease  Functional Limitations: prolonged sitting, walking 1-2 miles at brisk pace, gardening, stairs  Impairments: Lumbar ROM, RLE strength  [employer] Code: [email or web address]   POC expiration: 2024      Manuals  7 7        R lumbar and glute med STM nv np           Prone lumbar PA mobs nv            L sidelying R rot grade 2/3 mobs nv                         Neuro Re-Ed             Seated sciatic nerve glide nv 10x3 3x10          Supine 90/90 sciatic nerve glide  10x3 3x10 10ea 10x3 ea        Hooklying TA nv 5\"x10 5\"x10 5\"x10 5\"x10        Hooklying march nv 10x2 2x10 2x10 10x2                                                            Ther Ex             bike nv 5 min 6 min 6' 6'        Standing lumbar ext HEP 10x2 nv          LTR HEP 10x2 10ea 15ea X15 ea        Open book nv 10x2 ea 2x10 ea 10ea         Supine SLR nv 10x2 2x10 2x10 10x2        Sidelying hip abd nv 10x2 2x10 2x10 10x2 ea        bridge   2x10 2x10 10x2        Seated TR   2x10 2x10         Standing gastroc strectch    2x20\" ea 2x20\" ea + soleus " "                     Leg press RLE nv  35# 2x10 35# 2x10 35# 2x10        squats   nv 2x10 10x2                     Ther Activity             Fwd step up   4\" 2x10 6\"x15ea 8\"x15 ea                     Gait Training                                       Modalities                                            "

## 2024-07-09 ENCOUNTER — OFFICE VISIT (OUTPATIENT)
Dept: FAMILY MEDICINE CLINIC | Facility: CLINIC | Age: 64
End: 2024-07-09
Payer: MEDICARE

## 2024-07-09 VITALS
BODY MASS INDEX: 26.32 KG/M2 | TEMPERATURE: 98.1 F | HEART RATE: 64 BPM | HEIGHT: 67 IN | OXYGEN SATURATION: 97 % | RESPIRATION RATE: 15 BRPM | DIASTOLIC BLOOD PRESSURE: 72 MMHG | WEIGHT: 167.7 LBS | SYSTOLIC BLOOD PRESSURE: 124 MMHG

## 2024-07-09 DIAGNOSIS — M54.9 UPPER BACK PAIN ON LEFT SIDE: ICD-10-CM

## 2024-07-09 DIAGNOSIS — R73.9 HYPERGLYCEMIA: ICD-10-CM

## 2024-07-09 DIAGNOSIS — F17.211 NICOTINE DEPENDENCE, CIGARETTES, IN REMISSION: ICD-10-CM

## 2024-07-09 DIAGNOSIS — R79.89 ABNORMAL CBC: ICD-10-CM

## 2024-07-09 DIAGNOSIS — E78.1 ESSENTIAL HYPERTRIGLYCERIDEMIA: ICD-10-CM

## 2024-07-09 DIAGNOSIS — E03.8 OTHER SPECIFIED HYPOTHYROIDISM: Primary | ICD-10-CM

## 2024-07-09 PROCEDURE — G2211 COMPLEX E/M VISIT ADD ON: HCPCS | Performed by: PHYSICIAN ASSISTANT

## 2024-07-09 PROCEDURE — 99213 OFFICE O/P EST LOW 20 MIN: CPT | Performed by: PHYSICIAN ASSISTANT

## 2024-07-09 NOTE — PROGRESS NOTES
Assessment/Plan:    Right upper back pain - reproducible pain, probably from new exercises, try bengay or other OTC and discuss with PT    Former smoker - CT screen ordered    Labs ordered for medicare wellness    F/u as needed    Subjective:   Chief Complaint   Patient presents with    Cough    left upper back pain     Shoulder blade area for the past week       Patient ID: Jerardo Sanon is a 64 y.o. female.    Patient here for follow up on sciatica. Stopped gabapentin because of dizziness, pain has improved but still present. Working with PT on strengthening, and still with some right foot flop and completely resolving sciatic pain.    Now with pain in upper back, left shoulder blade. Feeling lung capacity is not where it should be. Cough causes an increase in pain, also noting some spasms. Only about a week. Denies fever, chills.        The following portions of the patient's history were reviewed and updated as appropriate: allergies, current medications, past family history, past medical history, past social history, past surgical history, and problem list.    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 No pertinent surgical history.  Family History   Problem Relation Age of Onset    Heart disease Mother     Heart defect Mother     Hypertension Father     Diabetes type II Father     No Known Problems Daughter     No Known Problems Maternal Grandmother     No Known Problems Maternal Grandfather     No Known Problems Paternal Grandmother     No Known Problems Paternal  Grandfather     No Known Problems Brother     No Known Problems Maternal Aunt     No Known Problems Maternal Aunt     No Known Problems Paternal Aunt     No Known Problems Paternal Aunt     Alcohol abuse Neg Hx     Substance Abuse Neg Hx     Mental illness Neg Hx      Social History     Socioeconomic History    Marital status: /Civil Union     Spouse name: Not on file    Number of children: Not on file    Years of education: Not on file    Highest education level: Not on file   Occupational History    Not on file   Tobacco Use    Smoking status: Former     Current packs/day: 0.00     Average packs/day: 0.3 packs/day for 30.0 years (7.5 ttl pk-yrs)     Types: Cigarettes     Quit date: 2018     Years since quittin.9    Smokeless tobacco: Never    Tobacco comments:     quit   Vaping Use    Vaping status: Never Used   Substance and Sexual Activity    Alcohol use: Yes     Alcohol/week: 1.0 standard drink of alcohol     Types: 1 Glasses of wine per week     Comment: social - 1 glass of wine a month    Drug use: No    Sexual activity: Not Currently     Partners: Male     Birth control/protection: None   Other Topics Concern    Not on file   Social History Narrative    Uses safety equipment - seatbelts     Social Determinants of Health     Financial Resource Strain: Not on file   Food Insecurity: Not on file   Transportation Needs: Not on file   Physical Activity: Not on file   Stress: Not on file   Social Connections: Not on file   Intimate Partner Violence: Not on file   Housing Stability: Not on file       Current Outpatient Medications:     Ascorbic Acid (VITAMIN C) 500 MG/5ML LIQD, Take 1 tablet by mouth daily, Disp: , Rfl:     b complex vitamins capsule, Take 1 capsule by mouth daily, Disp: , Rfl:     Biotin 10 MG CAPS, Take by mouth daily, Disp: , Rfl:     calcium citrate-vitamin D (CITRACAL+D) 315-200 MG-UNIT per tablet, Take 1 tablet by mouth daily, Disp: , Rfl:     Cholecalciferol (VITAMIN D3) 1000  "UNIT/SPRAY LIQD, Take 1 capsule by mouth daily, Disp: , Rfl:     fenofibrate (TRICOR) 145 mg tablet, take 1 tablet by mouth once daily, Disp: 90 tablet, Rfl: 1    levothyroxine 88 mcg tablet, take 1 tablet by mouth once daily, Disp: 90 tablet, Rfl: 1    Multiple Vitamins-Minerals (MULTI-VITAMIN/MINERALS PO), Take 1 tablet by mouth daily, Disp: , Rfl:     Omega-3 Fatty Acids (OMEGA-3 FISH OIL) 1000 MG CAPS, Take 1 capsule by mouth daily, Disp: , Rfl:     vitamin E 400 UNIT/15ML LIQD, Take 1 capsule by mouth daily, Disp: , Rfl:     EPINEPHrine (EPIPEN) 0.3 mg/0.3 mL SOAJ, Inject 0.3 mL (0.3 mg total) into a muscle once for 1 dose, Disp: 0.6 mL, Rfl: 3    propranolol (INDERAL) 40 mg tablet, take 30 MINUTES prior to procedure as directed by prescriber (Patient not taking: Reported on 5/6/2024), Disp: 20 tablet, Rfl: 0    White Petrolatum-Mineral Oil (artificial tears), Administer into the left eye as needed for dry eyes for up to 14 days, Disp: 3.5 g, Rfl: 0    Review of Systems          Objective:    Vitals:    07/09/24 1006   BP: 124/72   Pulse: 64   Resp: 15   Temp: 98.1 °F (36.7 °C)   SpO2: 97%   Weight: 76.1 kg (167 lb 11.2 oz)   Height: 5' 6.5\" (1.689 m)        Physical Exam  Constitutional:       Appearance: Normal appearance.   HENT:      Head: Normocephalic and atraumatic.   Musculoskeletal:         General: Normal range of motion.      Comments: Left mid thoracic back, tender to palpation, pain reproduced with palpation   Neurological:      General: No focal deficit present.      Mental Status: She is alert and oriented to person, place, and time.   Psychiatric:         Mood and Affect: Mood normal.         Behavior: Behavior normal.         Thought Content: Thought content normal.         Judgment: Judgment normal.               "

## 2024-07-10 ENCOUNTER — OFFICE VISIT (OUTPATIENT)
Dept: PHYSICAL THERAPY | Facility: CLINIC | Age: 64
End: 2024-07-10
Payer: MEDICARE

## 2024-07-10 DIAGNOSIS — M54.50 LOW BACK PAIN RADIATING TO RIGHT LEG: Primary | ICD-10-CM

## 2024-07-10 DIAGNOSIS — M79.604 LOW BACK PAIN RADIATING TO RIGHT LEG: Primary | ICD-10-CM

## 2024-07-10 PROCEDURE — 97110 THERAPEUTIC EXERCISES: CPT

## 2024-07-10 PROCEDURE — 97112 NEUROMUSCULAR REEDUCATION: CPT

## 2024-07-10 NOTE — PROGRESS NOTES
"Daily Note     Today's date: 7/10/2024  Patient name: Jerardo Sanon  : 1960  MRN: [id number]  Referring provider: Rebeca Obregon P*  Dx:   Encounter Diagnosis     ICD-10-CM    1. Low back pain radiating to right leg  M54.50     M79.604                      Subjective: Pt reports her her tailbone is feeling better and doesn't have any pain in R leg. She notes feeling a \"muscle burn\" in her legs likely from the strengthening exercises. Notes having L sided pain below her shoulder blade and was having some muscle spasms.    Objective: See treatment diary below      Assessment: Patient tolerated treatment well. Continued with LE strengthening exercises today and was able to progress resistance as noted below. Initiated balance activities on unstable surface which she was able to correct any LOB independently. She had difficulty with stepping over the hurdles with a reciprocal pattern due to balance. Will benefit from skilled PT to address impairments and return to PLOF      Plan: Continue per plan of care.  Progress treatment as tolerated.       Precautions: Bell's Palsy, Lyme Disease  Functional Limitations: prolonged sitting, walking 1-2 miles at brisk pace, gardening, stairs  Impairments: Lumbar ROM, RLE strength  [facility] [email or web address]   POC expiration: 2024      Manuals  7 710       R lumbar and glute med STM nv np           Prone lumbar PA mobs nv            L sidelying R rot grade 2/3 mobs nv                         Neuro Re-Ed             Seated sciatic nerve glide nv 10x3 3x10          Supine 90/90 sciatic nerve glide  10x3 3x10 10ea 10x3 ea        Hooklying TA nv 5\"x10 5\"x10 5\"x10 5\"x10        Hooklying march nv 10x2 2x10 2x10 10x2 2x10       Sidestep foam      3 laps       Tandem walk foam      3 laps       TB 4 way kick      GTB 10ea       Forward hurdles      Med height 3 laps       Ther Ex             bike nv 5 min 6 min 6' 6' 6'       Standing lumbar ext HEP 10x2 nv " "         LTR HEP 10x2 10ea 15ea X15 ea 10ea       Open book nv 10x2 ea 2x10 ea 10ea  hold       Supine SLR nv 10x2 2x10 2x10 10x2 1.5# 2x10       Sidelying hip abd nv 10x2 2x10 2x10 10x2 ea 1.5# 2x10       bridge   2x10 2x10 10x2 2x10       Seated TR   2x10 2x10         Standing gastroc strectch    2x20\" ea 2x20\" ea + soleus  2x20\" ea + soleus  slant                    Leg press RLE nv  35# 2x10 35# 2x10 35# 2x10 45# 2x10       squats   nv 2x10 10x2 2x10                    Ther Activity             Fwd step up   4\" 2x10 6\"x15ea 8\"x15 ea 8\"x15ea                    Gait Training                                       Modalities                                            "

## 2024-07-15 ENCOUNTER — OFFICE VISIT (OUTPATIENT)
Dept: PHYSICAL THERAPY | Facility: CLINIC | Age: 64
End: 2024-07-15
Payer: MEDICARE

## 2024-07-15 DIAGNOSIS — M79.604 LOW BACK PAIN RADIATING TO RIGHT LEG: Primary | ICD-10-CM

## 2024-07-15 DIAGNOSIS — M54.50 LOW BACK PAIN RADIATING TO RIGHT LEG: Primary | ICD-10-CM

## 2024-07-15 PROCEDURE — 97110 THERAPEUTIC EXERCISES: CPT

## 2024-07-15 PROCEDURE — 97112 NEUROMUSCULAR REEDUCATION: CPT

## 2024-07-15 NOTE — PROGRESS NOTES
"Daily Note     Today's date: 7/15/2024  Patient name: Jerardo Sanon  : 1960  MRN: [id number]  Referring provider: Rebeca Obregon P*  Dx:   Encounter Diagnosis     ICD-10-CM    1. Low back pain radiating to right leg  M54.50     M79.604                      Subjective: Pt reports she has been doing pretty good since her LV.     Objective: See treatment diary below      Assessment: Patient tolerated treatment well. Continued with LE strengthening exercises. Still has difficulty with balance activities which need continued work. Pt bought a slant board so she was educated on how to use and different exercises she could do that would be beneficial for her condition. She would continue to benefit from skilled PT.     Plan: Continue per plan of care.  Progress treatment as tolerated.       Precautions: Bell's Palsy, Lyme Disease  Functional Limitations: prolonged sitting, walking 1-2 miles at brisk pace, gardening, stairs  Impairments: Lumbar ROM, RLE strength  [employer] Code: [email or web address]   POC expiration: 2024      Manuals  7 7 7/10 7/15      R lumbar and glute med STM nv np           Prone lumbar PA mobs nv            L sidelying R rot grade 2/3 mobs nv                         Neuro Re-Ed             Seated sciatic nerve glide nv 10x3 3x10          Supine 90/90 sciatic nerve glide  10x3 3x10 10ea 10x3 ea        Hooklying TA nv 5\"x10 5\"x10 5\"x10 5\"x10        Hooklying march nv 10x2 2x10 2x10 10x2 2x10 10x2      Sidestep foam      3 laps 3 laps      Tandem walk foam      3 laps 3 laps      TB 4 way kick      GTB 10ea GTB 20ea      Forward hurdles      Med height 3 laps       Ther Ex             bike nv 5 min 6 min 6' 6' 6' 6'      Standing lumbar ext HEP 10x2 nv          LTR HEP 10x2 10ea 15ea X15 ea 10ea 10 ea      Open book nv 10x2 ea 2x10 ea 10ea  hold       Supine SLR nv 10x2 2x10 2x10 10x2 1.5# 2x10 1.5# 2x10      Sidelying hip abd nv 10x2 2x10 2x10 10x2 ea 1.5# 2x10 1.5# 2x10    " "  bridge   2x10 2x10 10x2 2x10 10x2      Seated TR   2x10 2x10         Standing gastroc strectch    2x20\" ea 2x20\" ea + soleus  2x20\" ea + soleus  slant 2x20\" ea + soleus  slant                   Leg press RLE nv  35# 2x10 35# 2x10 35# 2x10 45# 2x10 45#  10x2      squats   nv 2x10 10x2 2x10 Slant x10                   Ther Activity             Fwd step up   4\" 2x10 6\"x15ea 8\"x15 ea 8\"x15ea 8\"x10                   Gait Training                                       Modalities                                            "

## 2024-07-17 ENCOUNTER — EVALUATION (OUTPATIENT)
Dept: PHYSICAL THERAPY | Facility: CLINIC | Age: 64
End: 2024-07-17
Payer: MEDICARE

## 2024-07-17 DIAGNOSIS — M79.604 LOW BACK PAIN RADIATING TO RIGHT LEG: Primary | ICD-10-CM

## 2024-07-17 DIAGNOSIS — M54.50 LOW BACK PAIN RADIATING TO RIGHT LEG: Primary | ICD-10-CM

## 2024-07-17 PROCEDURE — 97112 NEUROMUSCULAR REEDUCATION: CPT

## 2024-07-17 PROCEDURE — 97110 THERAPEUTIC EXERCISES: CPT

## 2024-07-17 NOTE — PROGRESS NOTES
PT Re-Evaluation     Today's date: 2024  Patient name: Jerardo Sanon  : 1960  MRN: [id number]  Referring provider: Rebeca Obregon P*  Dx:   Encounter Diagnosis     ICD-10-CM    1. Low back pain radiating to right leg  M54.50     M79.604                      Assessment  Impairments: abnormal gait, abnormal or restricted ROM, activity intolerance, impaired physical strength, lacks appropriate home exercise program, pain with function and poor body mechanics    Assessment details: Jerardo Sanon is a pleasant 64 y.o. female who presents with R sided LBP with R sided radiating pain/parasthesias beginning about a month ago. She presents with mildly limited lumbar ROM and mildly decreased R shoulder strength. Note (+) slump on R. These impairments are limiting her with walking 1-2 miles each day, gardening, sleeping, and prolonged sitting. hese signs and symptoms are consistent with Low back pain radiating to right leg. She will benefit from skilled PT to address impairments and return to PLOF. No referral is necessary    2024: Patient has made good progress in lumbar ROM and BLE strength since beginning skilled PT but continues to be limited with ankle DF strength, hip flexion strength, and glute strength. She has decreased BLE strength noted with decreased 5x sit to stand time as well as decreased endurance with walking noted with decreased distance with the 6 minute walk test compared to age related normative values. These impairments are limiting her with standing to cook longer than 30 minutes, going on 1-2 mile walks, stairs. Will benefit from skilled PT to address impairments and return to PLOF.       Prognosis details: Positive prognostic indicators include positive attitude toward recovery, motivated to improve, high self-efficacy, good understanding of condition, realistic expectations.  Negative prognostic indicators include high symptom irritability    Goals  STG to be achieved in 4  weeks  Patient will have improved lumbar ROM in all planes to WNLs (met)  Patient will have improved gross BLE strength to 5/5(good progress)  Able to walk 1 mile each day (good progress)  Patient will be independent with HEP.(Met)    LTG to be achieved in 8 weeks  Patient will be able to walk 2 miles (good progress)  Patient will be able to ascend/descend stairs(good progress)  Patient will be able to return to gardening(good progress)    New STG made on 7/17 to be met in 4 weeks  5x time sit to stand improved to 10 seconds  6 minute walk test improved to at least 538 meters    Plan  Patient would benefit from: skilled physical therapy  Planned modality interventions: cryotherapy and thermotherapy: hydrocollator packs    Planned therapy interventions: balance, home exercise program, gait training, functional ROM exercises, body mechanics training, joint mobilization, manual therapy, neuromuscular re-education, therapeutic exercise, therapeutic activities, stretching, strengthening, flexibility and patient/caregiver education    Frequency: 2x week  Plan of Care beginning date: 7/17/2024  Plan of Care expiration date: 9/11/2024  Treatment plan discussed with: patient and PTA        Subjective Evaluation    History of Present Illness  Mechanism of injury: Patient reports R LBP and R buttock pain beginning about 3 weeks ago. About 2 weeks ago she had facial droop and went to ED - the assessed her to rule out stroke. She was diagnosed with Bell's Palsy and Lyme Disease.     Her pain is better with walking. Notes sitting bothers her. Lying down relieves pain. Is having trouble with her balance due to the eye from Bell's Palsy. Pain wakes her up at night. Notes increased pain with stairs and feels her R leg is weaker. She does try stretching by bending forward. She does get numbness into her R leg - posterior lateral thigh into R lower leg that is worse in the morning and gets better with movement    On average she walks  "about 7,000 steps a day.     Notes she used to walk 1-2 miles a day - would like to get back to do this. Notes she did lose 10 pounds. She wants to get back to gardening.     2024: Patient notes sometimes having R buttock pain in the morning but this gets better as she gets up and moves around. She feels her R leg isn't as strong yet. Swims for 30 minutes each day. Fatigue with standing to cook more than 30 minutes and with doing a lot of other housework or walking. Notes still feeling her foot doesn't have good control with walking    Patient Goals  Patient goal: Patient wants to be able to returning to walking, build up her endurance, return to gardening  Pain  Current pain ratin  At best pain ratin  At worst pain ratin  Relieving factors: heat, ice and medications        Objective     Active Range of Motion     Lumbar   Flexion:  WFL  Extension:  WFL    Additional Active Range of Motion Details  Bilateral sideglide: WNLs, painfree    Strength/Myotome Testing     Left Hip   Planes of Motion   Flexion: 4+    Right Hip   Planes of Motion   Flexion: 4+    Left Knee   Flexion: 4+  Extension: 5    Right Knee   Flexion: 4+  Extension: 5    Left Ankle/Foot   Dorsiflexion: 5  Inversion: 5    Right Ankle/Foot   Dorsiflexion: 4  Inversion: 5    Tests     Lumbar     Right   Negative slump test.     Functional Assessment        Comments  1589 feet (484 meters)                 Precautions: Bell's Palsy, Lyme Disease  Functional Limitations: prolonged sitting, walking 1-2 miles at brisk pace, gardening, stairs  Impairments: Lumbar ROM, RLE strength  [employer] Code: [email or web address]   POC expiration: 2024    Manuals 8 7/10 7/15 7/17       R lumbar and glute med STM            Prone lumbar PA mobs            L sidelying R rot grade 2/3 mobs                        Neuro Re-Ed            Seated sciatic nerve glide            Supine 90/90 sciatic nerve glide 10ea 10x3 ea          Hooklying TA 5\"x10 5\"x10        " "  Hooklying march 2x10 10x2 2x10 10x2        Sidestep foam   3 laps 3 laps        Tandem walk foam   3 laps 3 laps        TB 4 way kick   GTB 10ea GTB 20ea        Forward hurdles   Med height 3 laps         Ther Ex            bike 6' 6' 6' 6' 6'       Standing lumbar ext            LTR 15ea X15 ea 10ea 10 ea nv       Open book 10ea  hold         Supine SLR 2x10 10x2 1.5# 2x10 1.5# 2x10 Nv       Sidelying hip abd 2x10 10x2 ea 1.5# 2x10 1.5# 2x10        bridge 2x10 10x2 2x10 10x2 nv       Seated TR 2x10           Standing gastroc strectch 2x20\" ea 2x20\" ea + soleus  2x20\" ea + soleus  slant 2x20\" ea + soleus  slant nv       TB ankle DF     OTB 2x10       Leg press RLE 35# 2x10 35# 2x10 45# 2x10 45#  10x2 45# 2x10       squats 2x10 10x2 2x10 Slant x10 nv       Fwd lunge     nv       Lat lunge     nv       Seated leg curl machine     nv       Seated leg ext machine     nv       Ther Activity            Fwd step up 6\"x15ea 8\"x15 ea 8\"x15ea 8\"x10 nv                   Gait Training                                    Modalities                                           "

## 2024-07-22 ENCOUNTER — OFFICE VISIT (OUTPATIENT)
Dept: PHYSICAL THERAPY | Facility: CLINIC | Age: 64
End: 2024-07-22
Payer: MEDICARE

## 2024-07-22 DIAGNOSIS — M54.50 LOW BACK PAIN RADIATING TO RIGHT LEG: Primary | ICD-10-CM

## 2024-07-22 DIAGNOSIS — M79.604 LOW BACK PAIN RADIATING TO RIGHT LEG: Primary | ICD-10-CM

## 2024-07-22 PROCEDURE — 97110 THERAPEUTIC EXERCISES: CPT

## 2024-07-22 NOTE — PROGRESS NOTES
"Daily Note     Today's date: 2024  Patient name: Jerardo Sanon  : 1960  MRN: [id number]  Referring provider: Rebeca Obregon P*  Dx:   Encounter Diagnosis     ICD-10-CM    1. Low back pain radiating to right leg  M54.50     M79.604                      Subjective: Patient notes doing okay overall. She feels there has been some improvements      Objective: See treatment diary below      Assessment: Patient tolerated treatment well overall. Progress BLE strength with lunges and seated leg extension. Education on HEP. Will benefit from skilled PT to continue progressing BLE strength especially on RLE.      Plan: continue focusing on progressing strength in RLE     Precautions: Bell's Palsy, Lyme Disease  Functional Limitations: prolonged sitting, walking 1-2 miles at brisk pace, gardening, stairs  Impairments: Lumbar ROM, RLE strength  [employer] Code: [email or web address]   POC expiration: 2024    Manuals 7/5 7/8 7/10 7/15 7/17 7/22                                                      Neuro Re-Ed            Seated sciatic nerve glide            Supine 90/90 sciatic nerve glide 10ea 10x3 ea          Hooklying TA 5\"x10 5\"x10          Hooklying march 2x10 10x2 2x10 10x2        Sidestep foam   3 laps 3 laps        Tandem walk foam   3 laps 3 laps        TB 4 way kick   GTB 10ea GTB 20ea        Forward hurdles   Med height 3 laps         Ther Ex            bike 6' 6' 6' 6' 6' 6'      Standing lumbar ext            LTR 15ea X15 ea 10ea 10 ea nv 10ea      Open book 10ea  hold         Supine SLR 2x10 10x2 1.5# 2x10 1.5# 2x10 Nv 2# 2x10      Sidelying hip abd 2x10 10x2 ea 1.5# 2x10 1.5# 2x10        bridge 2x10 10x2 2x10 10x2 nv 2x10      Seated TR 2x10           Standing TR leaning against wall      2x10      Standing gastroc strectch 2x20\" ea 2x20\" ea + soleus  2x20\" ea + soleus  slant 2x20\" ea + soleus  slant nv 20\"x3      TB ankle DF     OTB 2x10       Leg press RLE 35# 2x10 35# 2x10 45# 2x10 45#  10x2 45# 2x10 45# " "2x10      squats 2x10 10x2 2x10 Slant x10 nv 2x10      Fwd lunge     nv 10ea      Lat lunge     nv 10ea      Seated leg curl machine     nv nv      Seated leg ext machine     nv 35# 2x10      Ther Activity            Fwd step up 6\"x15ea 8\"x15 ea 8\"x15ea 8\"x10 nv 8\" 2x10 ea                  Gait Training                                    Modalities                                           "

## 2024-07-24 ENCOUNTER — OFFICE VISIT (OUTPATIENT)
Dept: PHYSICAL THERAPY | Facility: CLINIC | Age: 64
End: 2024-07-24
Payer: MEDICARE

## 2024-07-24 DIAGNOSIS — M79.604 LOW BACK PAIN RADIATING TO RIGHT LEG: Primary | ICD-10-CM

## 2024-07-24 DIAGNOSIS — M54.50 LOW BACK PAIN RADIATING TO RIGHT LEG: Primary | ICD-10-CM

## 2024-07-24 PROCEDURE — 97110 THERAPEUTIC EXERCISES: CPT

## 2024-07-24 NOTE — PROGRESS NOTES
"Daily Note     Today's date: 2024  Patient name: Jerardo Sanon  : 1960  MRN: [id number]  Referring provider: Rebeca Obregon P*  Dx:   Encounter Diagnosis     ICD-10-CM    1. Low back pain radiating to right leg  M54.50     M79.604                      Subjective: Patient reports feeling good. She is noticing improvements in her strength      Objective: See treatment diary below      Assessment: Patient tolerated treatment well overall. Continued to progress BLE strength. Add leg curl machine for strength. Will add weight for squats next visit. Discussed transition to 1x/week and beginning to exercise at the [facility] in 2 weeks to gradually transition to HEP. Will benefit from skilled PT to address impairments and return to PLOF      Plan: continue focusing on progressing strength in RLE     Precautions: Bell's Palsy, Lyme Disease  Functional Limitations: prolonged sitting, walking 1-2 miles at brisk pace, gardening, stairs  Impairments: Lumbar ROM, RLE strength  [email or web address]   POC expiration: 2024    Manuals 7/5 7/8 7/10 7/15 7/17 7/22 7/24                                                     Neuro Re-Ed            Seated sciatic nerve glide            Supine 90/90 sciatic nerve glide 10ea 10x3 ea          Hooklying TA 5\"x10 5\"x10          Hooklying march 2x10 10x2 2x10 10x2        Sidestep foam   3 laps 3 laps        Tandem walk foam   3 laps 3 laps        TB 4 way kick   GTB 10ea GTB 20ea   OTB 10ea     Forward hurdles   Med height 3 laps         Ther Ex            bike 6' 6' 6' 6' 6' 6' 6'     Standing lumbar ext            LTR 15ea X15 ea 10ea 10 ea nv 10ea      Open book 10ea  hold         Supine SLR 2x10 10x2 1.5# 2x10 1.5# 2x10 Nv 2# 2x10 2# 2x10     Sidelying hip abd 2x10 10x2 ea 1.5# 2x10 1.5# 2x10        bridge 2x10 10x2 2x10 10x2 nv 2x10 2x10     Seated TR 2x10           Standing TR leaning against wall      2x10 2x10     Standing gastroc strectch 2x20\" ea 2x20\" ea + soleus  " "2x20\" ea + soleus  slant 2x20\" ea + soleus  slant nv 20\"x3 30\"x3     TB ankle DF     OTB 2x10       Leg press RLE 35# 2x10 35# 2x10 45# 2x10 45#  10x2 45# 2x10 45# 2x10 45# 2x10     squats 2x10 10x2 2x10 Slant x10 nv 2x10 Slant 2x10     Fwd lunge     nv 10ea 10ea     Lat lunge     nv 10ea 10 ea     Seated leg curl machine     nv nv 35# 2x10     Seated leg ext machine     nv 35# 2x10 35# 2x10     Ther Activity            Fwd step up 6\"x15ea 8\"x15 ea 8\"x15ea 8\"x10 nv 8\" 2x10 ea 8\" 2x10 ea                 Gait Training                                    Modalities                                           "

## 2024-07-29 ENCOUNTER — OFFICE VISIT (OUTPATIENT)
Dept: PHYSICAL THERAPY | Facility: CLINIC | Age: 64
End: 2024-07-29
Payer: MEDICARE

## 2024-07-29 DIAGNOSIS — M79.604 LOW BACK PAIN RADIATING TO RIGHT LEG: Primary | ICD-10-CM

## 2024-07-29 DIAGNOSIS — M54.50 LOW BACK PAIN RADIATING TO RIGHT LEG: Primary | ICD-10-CM

## 2024-07-29 PROCEDURE — 97110 THERAPEUTIC EXERCISES: CPT

## 2024-07-29 NOTE — PROGRESS NOTES
"Daily Note     Today's date: 2024  Patient name: Jerardo Sanon  : 1960  MRN: [id number]  Referring provider: Rebeca Obregon P*  Dx:   Encounter Diagnosis     ICD-10-CM    1. Low back pain radiating to right leg  M54.50     M79.604                      Subjective: Patient reports that she has been working the ankle more but thinks it gets tired so she feels her foot flops when she walks.      Objective: See treatment diary below      Assessment: Patient tolerated treatment well overall. Initiated single leg stance on RLE - more challenging on RLE compared to LLE - added this to HEP. She is progressing well with strength and will benefit from skilled PT to address RLE strength to return to PLOF      Plan: continue focusing on progressing strength in RLE; progress single leg balance on RLE     Precautions: Bell's Palsy, Lyme Disease  Functional Limitations: prolonged sitting, walking 1-2 miles at brisk pace, gardening, stairs  Impairments: Lumbar ROM, RLE strength  [email or web address]   POC expiration: 2024    Manuals 7/5 7/8 710 7/15 7/17 7/22 7/24 7/29                                                    Neuro Re-Ed            SLS        10\"x5    Supine 90/90 sciatic nerve glide 10ea 10x3 ea          Hooklying TA 5\"x10 5\"x10          Hooklying march 2x10 10x2 2x10 10x2        Sidestep foam   3 laps 3 laps    3 laps    Tandem walk foam   3 laps 3 laps    3 laps    TB 4 way kick   GTB 10ea GTB 20ea   OTB 10ea GTB 10ea     Forward hurdles   Med height 3 laps         Ther Ex            bike 6' 6' 6' 6' 6' 6' 6' np    Standing lumbar ext            LTR 15ea X15 ea 10ea 10 ea nv 10ea  10ea    Open book 10ea  hold         Supine SLR 2x10 10x2 1.5# 2x10 1.5# 2x10 Nv 2# 2x10 2# 2x10 2# 2x10    Sidelying hip abd 2x10 10x2 ea 1.5# 2x10 1.5# 2x10        bridge 2x10 10x2 2x10 10x2 nv 2x10 2x10 2x10    Seated TR 2x10           Standing TR leaning against wall      2x10 2x10     Standing gastroc strectch " "2x20\" ea 2x20\" ea + soleus  2x20\" ea + soleus  slant 2x20\" ea + soleus  slant nv 20\"x3 30\"x3 30\"x3    TB ankle DF     OTB 2x10       Leg press RLE 35# 2x10 35# 2x10 45# 2x10 45#  10x2 45# 2x10 45# 2x10 45# 2x10 45# 2x10    squats 2x10 10x2 2x10 Slant x10 nv 2x10 Slant 2x10 Slant 2x10    Fwd lunge     nv 10ea 10ea 10ea    Lat lunge     nv 10ea 10 ea 10ea    Seated leg curl machine     nv nv 35# 2x10 35# 2x10    Seated leg ext machine     nv 35# 2x10 35# 2x10 35# 2x10    Ther Activity            Fwd step up 6\"x15ea 8\"x15 ea 8\"x15ea 8\"x10 nv 8\" 2x10 ea 8\" 2x10 ea 8\" 2x10 ea                Gait Training                                    Modalities                                           "

## 2024-07-30 ENCOUNTER — RA CDI HCC (OUTPATIENT)
Dept: OTHER | Facility: HOSPITAL | Age: 64
End: 2024-07-30

## 2024-07-31 ENCOUNTER — OFFICE VISIT (OUTPATIENT)
Dept: PHYSICAL THERAPY | Facility: CLINIC | Age: 64
End: 2024-07-31
Payer: MEDICARE

## 2024-07-31 DIAGNOSIS — M79.604 LOW BACK PAIN RADIATING TO RIGHT LEG: Primary | ICD-10-CM

## 2024-07-31 DIAGNOSIS — M54.50 LOW BACK PAIN RADIATING TO RIGHT LEG: Primary | ICD-10-CM

## 2024-07-31 PROCEDURE — 97112 NEUROMUSCULAR REEDUCATION: CPT

## 2024-07-31 PROCEDURE — 97110 THERAPEUTIC EXERCISES: CPT

## 2024-07-31 NOTE — PROGRESS NOTES
"Daily Note     Today's date: 2024  Patient name: Jerardo Sanon  : 1960  MRN: [id number]  Referring provider: Rebeca Obregon P*  Dx:   Encounter Diagnosis     ICD-10-CM    1. Low back pain radiating to right leg  M54.50     M79.604                      Subjective: Patient reports doing well overall. Is seeing improvements in her strength      Objective: See treatment diary below      Assessment: Patient tolerated treatment well. Continued to focus on BLE strength and balance. Added sit to stands for BLE strength and as a functional activity for sit to stand transfers. Will benefit from skilled PT to continue to progress strength to return to PLOF      Plan: continue focusing on progressing strength in RLE; progress single leg balance on RLE     Precautions: Bell's Palsy, Lyme Disease  Functional Limitations: prolonged sitting, walking 1-2 miles at brisk pace, gardening, stairs  Impairments: Lumbar ROM, RLE strength  [employer] Code: [email or web address]   POC expiration: 2024    Manuals 7/15 7/17 7/22 7/24 7/29 7/31                                                 Neuro Re-Ed           SLS     10\"x5 10\"x5     Supine 90/90 sciatic nerve glide           Hooklying TA           Hooklying march 10x2          Sidestep foam 3 laps    3 laps 3 laps     Tandem walk foam 3 laps    3 laps 3 laps     TB 4 way kick GTB 20ea   OTB 10ea GTB 10ea  BTB 10ea     Forward hurdles           Ther Ex           bike 6' 6' 6' 6' np 6'     Standing lumbar ext           LTR 10 ea nv 10ea  10ea 10ea     Open book           Supine SLR 1.5# 2x10 Nv 2# 2x10 2# 2x10 2# 2x10 2# 2x10     Sidelying hip abd 1.5# 2x10          bridge 10x2 nv 2x10 2x10 2x10 2x10     Seated TR           Standing TR leaning against wall   2x10 2x10       Standing gastroc strectch 2x20\" ea + soleus  slant nv 20\"x3 30\"x3 30\"x3 30\"x3     TB ankle DF  OTB 2x10         Leg press RLE 45#  10x2 45# 2x10 45# 2x10 45# 2x10 45# 2x10 45# 3x10     squats Slant x10 nv 2x10 " "Slant 2x10 Slant 2x10 Slant 2x10     Fwd lunge  nv 10ea 10ea 10ea 10ea     Lat lunge  nv 10ea 10 ea 10ea 10ea     Seated leg curl machine  nv nv 35# 2x10 35# 2x10 35# 2x10     Seated leg ext machine  nv 35# 2x10 35# 2x10 35# 2x10 35# 2x10     Ther Activity           Fwd step up 8\"x10 nv 8\" 2x10 ea 8\" 2x10 ea 8\" 2x10 ea 8\" 2x10 ea     Sit to stands      2x5     Gait Training                                 Modalities                                        "

## 2024-08-02 ENCOUNTER — APPOINTMENT (OUTPATIENT)
Dept: LAB | Facility: HOSPITAL | Age: 64
End: 2024-08-02
Payer: MEDICARE

## 2024-08-02 DIAGNOSIS — E78.1 ESSENTIAL HYPERTRIGLYCERIDEMIA: ICD-10-CM

## 2024-08-02 DIAGNOSIS — R79.89 ABNORMAL CBC: ICD-10-CM

## 2024-08-02 LAB
ALBUMIN SERPL BCG-MCNC: 4.1 G/DL (ref 3.5–5)
ALP SERPL-CCNC: 33 U/L (ref 34–104)
ALT SERPL W P-5'-P-CCNC: 16 U/L (ref 7–52)
ANION GAP SERPL CALCULATED.3IONS-SCNC: 5 MMOL/L (ref 4–13)
AST SERPL W P-5'-P-CCNC: 22 U/L (ref 13–39)
BASOPHILS # BLD AUTO: 0.05 THOUSANDS/ÂΜL (ref 0–0.1)
BASOPHILS NFR BLD AUTO: 1 % (ref 0–1)
BILIRUB SERPL-MCNC: 0.49 MG/DL (ref 0.2–1)
BUN SERPL-MCNC: 13 MG/DL (ref 5–25)
CALCIUM SERPL-MCNC: 9.7 MG/DL (ref 8.4–10.2)
CHLORIDE SERPL-SCNC: 103 MMOL/L (ref 96–108)
CHOLEST SERPL-MCNC: 160 MG/DL
CO2 SERPL-SCNC: 31 MMOL/L (ref 21–32)
CREAT SERPL-MCNC: 0.78 MG/DL (ref 0.6–1.3)
EOSINOPHIL # BLD AUTO: 0.21 THOUSAND/ÂΜL (ref 0–0.61)
EOSINOPHIL NFR BLD AUTO: 4 % (ref 0–6)
ERYTHROCYTE [DISTWIDTH] IN BLOOD BY AUTOMATED COUNT: 15 % (ref 11.6–15.1)
EST. AVERAGE GLUCOSE BLD GHB EST-MCNC: 105 MG/DL
GFR SERPL CREATININE-BSD FRML MDRD: 80 ML/MIN/1.73SQ M
GLUCOSE SERPL-MCNC: 90 MG/DL (ref 65–140)
HBA1C MFR BLD: 5.3 %
HCT VFR BLD AUTO: 37.7 % (ref 34.8–46.1)
HDLC SERPL-MCNC: 38 MG/DL
HGB BLD-MCNC: 12.2 G/DL (ref 11.5–15.4)
IMM GRANULOCYTES # BLD AUTO: 0.02 THOUSAND/UL (ref 0–0.2)
IMM GRANULOCYTES NFR BLD AUTO: 0 % (ref 0–2)
LDLC SERPL CALC-MCNC: 93 MG/DL (ref 0–100)
LYMPHOCYTES # BLD AUTO: 1.78 THOUSANDS/ÂΜL (ref 0.6–4.47)
LYMPHOCYTES NFR BLD AUTO: 32 % (ref 14–44)
MCH RBC QN AUTO: 28.8 PG (ref 26.8–34.3)
MCHC RBC AUTO-ENTMCNC: 32.4 G/DL (ref 31.4–37.4)
MCV RBC AUTO: 89 FL (ref 82–98)
MONOCYTES # BLD AUTO: 0.41 THOUSAND/ÂΜL (ref 0.17–1.22)
MONOCYTES NFR BLD AUTO: 7 % (ref 4–12)
NEUTROPHILS # BLD AUTO: 3.12 THOUSANDS/ÂΜL (ref 1.85–7.62)
NEUTS SEG NFR BLD AUTO: 56 % (ref 43–75)
NRBC BLD AUTO-RTO: 0 /100 WBCS
PLATELET # BLD AUTO: 232 THOUSANDS/UL (ref 149–390)
PMV BLD AUTO: 9.4 FL (ref 8.9–12.7)
POTASSIUM SERPL-SCNC: 4.3 MMOL/L (ref 3.5–5.3)
PROT SERPL-MCNC: 6.7 G/DL (ref 6.4–8.4)
RBC # BLD AUTO: 4.24 MILLION/UL (ref 3.81–5.12)
SODIUM SERPL-SCNC: 139 MMOL/L (ref 135–147)
TRIGL SERPL-MCNC: 144 MG/DL
TSH SERPL DL<=0.05 MIU/L-ACNC: 1.84 UIU/ML (ref 0.45–4.5)
WBC # BLD AUTO: 5.59 THOUSAND/UL (ref 4.31–10.16)

## 2024-08-02 PROCEDURE — 85025 COMPLETE CBC W/AUTO DIFF WBC: CPT

## 2024-08-02 PROCEDURE — 84443 ASSAY THYROID STIM HORMONE: CPT

## 2024-08-02 PROCEDURE — 80053 COMPREHEN METABOLIC PANEL: CPT

## 2024-08-02 PROCEDURE — 80061 LIPID PANEL: CPT

## 2024-08-02 PROCEDURE — 36415 COLL VENOUS BLD VENIPUNCTURE: CPT

## 2024-08-06 ENCOUNTER — OFFICE VISIT (OUTPATIENT)
Dept: FAMILY MEDICINE CLINIC | Facility: CLINIC | Age: 64
End: 2024-08-06
Payer: MEDICARE

## 2024-08-06 VITALS
RESPIRATION RATE: 15 BRPM | HEIGHT: 67 IN | DIASTOLIC BLOOD PRESSURE: 90 MMHG | TEMPERATURE: 97.8 F | OXYGEN SATURATION: 99 % | SYSTOLIC BLOOD PRESSURE: 132 MMHG | WEIGHT: 170.4 LBS | BODY MASS INDEX: 26.74 KG/M2 | HEART RATE: 63 BPM

## 2024-08-06 DIAGNOSIS — Z00.00 WELCOME TO MEDICARE PREVENTIVE VISIT: Primary | ICD-10-CM

## 2024-08-06 DIAGNOSIS — Z12.11 COLON CANCER SCREENING: ICD-10-CM

## 2024-08-06 DIAGNOSIS — N39.3 FEMALE STRESS INCONTINENCE: ICD-10-CM

## 2024-08-06 DIAGNOSIS — E78.1 ESSENTIAL HYPERTRIGLYCERIDEMIA: ICD-10-CM

## 2024-08-06 DIAGNOSIS — Z12.31 ENCOUNTER FOR SCREENING MAMMOGRAM FOR BREAST CANCER: ICD-10-CM

## 2024-08-06 DIAGNOSIS — E03.8 OTHER SPECIFIED HYPOTHYROIDISM: ICD-10-CM

## 2024-08-06 PROCEDURE — 99213 OFFICE O/P EST LOW 20 MIN: CPT | Performed by: PHYSICIAN ASSISTANT

## 2024-08-06 PROCEDURE — G0402 INITIAL PREVENTIVE EXAM: HCPCS | Performed by: PHYSICIAN ASSISTANT

## 2024-08-06 NOTE — PATIENT INSTRUCTIONS
Medicare Preventive Visit Patient Instructions  Thank you for completing your Welcome to Medicare Visit or Medicare Annual Wellness Visit today. Your next wellness visit will be due in one year (8/7/2025).  The screening/preventive services that you may require over the next 5-10 years are detailed below. Some tests may not apply to you based off risk factors and/or age. Screening tests ordered at today's visit but not completed yet may show as past due. Also, please note that scanned in results may not display below.  Preventive Screenings:  Service Recommendations Previous Testing/Comments   Colorectal Cancer Screening  * Colonoscopy    * Fecal Occult Blood Test (FOBT)/Fecal Immunochemical Test (FIT)  * Fecal DNA/Cologuard Test  * Flexible Sigmoidoscopy Age: 45-75 years old   Colonoscopy: every 10 years (may be performed more frequently if at higher risk)  OR  FOBT/FIT: every 1 year  OR  Cologuard: every 3 years  OR  Sigmoidoscopy: every 5 years  Screening may be recommended earlier than age 45 if at higher risk for colorectal cancer. Also, an individualized decision between you and your healthcare provider will decide whether screening between the ages of 76-85 would be appropriate. Colonoscopy: Not on file  FOBT/FIT: Not on file  Cologuard: Not on file  Sigmoidoscopy: Not on file          Breast Cancer Screening Age: 40+ years old  Frequency: every 1-2 years  Not required if history of left and right mastectomy Mammogram: 08/24/2023    Screening Current   Cervical Cancer Screening Between the ages of 21-29, pap smear recommended once every 3 years.   Between the ages of 30-65, can perform pap smear with HPV co-testing every 5 years.   Recommendations may differ for women with a history of total hysterectomy, cervical cancer, or abnormal pap smears in past. Pap Smear: 01/27/2017    Screening Not Indicated   Hepatitis C Screening Once for adults born between 1945 and 1965  More frequently in patients at high risk  for Hepatitis C Hep C Antibody: Not on file    Screening Current   Diabetes Screening 1-2 times per year if you're at risk for diabetes or have pre-diabetes Fasting glucose: 97 mg/dL (7/8/2023)  A1C: 5.3 % (8/2/2024)  Screening Current   Cholesterol Screening Once every 5 years if you don't have a lipid disorder. May order more often based on risk factors. Lipid panel: 08/02/2024    Screening Not Indicated  History Lipid Disorder  Screening Current     Other Preventive Screenings Covered by Medicare:  Abdominal Aortic Aneurysm (AAA) Screening: covered once if your at risk. You're considered to be at risk if you have a family history of AAA.  Lung Cancer Screening: covers low dose CT scan once per year if you meet all of the following conditions: (1) Age 55-77; (2) No signs or symptoms of lung cancer; (3) Current smoker or have quit smoking within the last 15 years; (4) You have a tobacco smoking history of at least 20 pack years (packs per day multiplied by number of years you smoked); (5) You get a written order from a healthcare provider.  Glaucoma Screening: covered annually if you're considered high risk: (1) You have diabetes OR (2) Family history of glaucoma OR (3)  aged 50 and older OR (4)  American aged 65 and older  Osteoporosis Screening: covered every 2 years if you meet one of the following conditions: (1) You're estrogen deficient and at risk for osteoporosis based off medical history and other findings; (2) Have a vertebral abnormality; (3) On glucocorticoid therapy for more than 3 months; (4) Have primary hyperparathyroidism; (5) On osteoporosis medications and need to assess response to drug therapy.   Last bone density test (DXA Scan): 08/17/2022.  HIV Screening: covered annually if you're between the age of 15-65. Also covered annually if you are younger than 15 and older than 65 with risk factors for HIV infection. For pregnant patients, it is covered up to 3 times per  pregnancy.    Immunizations:  Immunization Recommendations   Influenza Vaccine Annual influenza vaccination during flu season is recommended for all persons aged >= 6 months who do not have contraindications   Pneumococcal Vaccine   * Pneumococcal conjugate vaccine = PCV13 (Prevnar 13), PCV15 (Vaxneuvance), PCV20 (Prevnar 20)  * Pneumococcal polysaccharide vaccine = PPSV23 (Pneumovax) Adults 19-65 yo with certain risk factors or if 65+ yo  If never received any pneumonia vaccine: recommend Prevnar 20 (PCV20)  Give PCV20 if previously received 1 dose of PCV13 or PPSV23   Hepatitis B Vaccine 3 dose series if at intermediate or high risk (ex: diabetes, end stage renal disease, liver disease)   Respiratory syncytial virus (RSV) Vaccine - COVERED BY MEDICARE PART D  * RSVPreF3 (Arexvy) CDC recommends that adults 60 years of age and older may receive a single dose of RSV vaccine using shared clinical decision-making (SCDM)   Tetanus (Td) Vaccine - COST NOT COVERED BY MEDICARE PART B Following completion of primary series, a booster dose should be given every 10 years to maintain immunity against tetanus. Td may also be given as tetanus wound prophylaxis.   Tdap Vaccine - COST NOT COVERED BY MEDICARE PART B Recommended at least once for all adults. For pregnant patients, recommended with each pregnancy.   Shingles Vaccine (Shingrix) - COST NOT COVERED BY MEDICARE PART B  2 shot series recommended in those 19 years and older who have or will have weakened immune systems or those 50 years and older     Health Maintenance Due:      Topic Date Due   • Cervical Cancer Screening  01/27/2019   • Colorectal Cancer Screening  08/02/2022   • Breast Cancer Screening: Mammogram  08/24/2024   • HIV Screening  05/06/2026 (Originally 3/21/1975)   • Hepatitis C Screening  06/06/2026 (Originally 1960)   • DXA SCAN  08/17/2025     Immunizations Due:      Topic Date Due   • Hepatitis A Vaccine (1 of 2 - Risk 2-dose series) Never done    • Pneumococcal Vaccine: 65+ Years (1 of 1 - PCV) Never done   • Influenza Vaccine (1) 09/01/2024     Advance Directives   What are advance directives?  Advance directives are legal documents that state your wishes and plans for medical care. These plans are made ahead of time in case you lose your ability to make decisions for yourself. Advance directives can apply to any medical decision, such as the treatments you want, and if you want to donate organs.   What are the types of advance directives?  There are many types of advance directives, and each state has rules about how to use them. You may choose a combination of any of the following:  Living will:  This is a written record of the treatment you want. You can also choose which treatments you do not want, which to limit, and which to stop at a certain time. This includes surgery, medicine, IV fluid, and tube feedings.   Durable power of  for healthcare (DPAHC):  This is a written record that states who you want to make healthcare choices for you when you are unable to make them for yourself. This person, called a proxy, is usually a family member or a friend. You may choose more than 1 proxy.  Do not resuscitate (DNR) order:  A DNR order is used in case your heart stops beating or you stop breathing. It is a request not to have certain forms of treatment, such as CPR. A DNR order may be included in other types of advance directives.  Medical directive:  This covers the care that you want if you are in a coma, near death, or unable to make decisions for yourself. You can list the treatments you want for each condition. Treatment may include pain medicine, surgery, blood transfusions, dialysis, IV or tube feedings, and a ventilator (breathing machine).  Values history:  This document has questions about your views, beliefs, and how you feel and think about life. This information can help others choose the care that you would choose.  Why are advance  directives important?  An advance directive helps you control your care. Although spoken wishes may be used, it is better to have your wishes written down. Spoken wishes can be misunderstood, or not followed. Treatments may be given even if you do not want them. An advance directive may make it easier for your family to make difficult choices about your care.   Urinary Incontinence   Urinary incontinence (UI)  is when you lose control of your bladder. UI develops because your bladder cannot store or empty urine properly. The 3 most common types of UI are stress incontinence, urge incontinence, or both.  Medicines:   May be given to help strengthen your bladder control. Report any side effects of medication to your healthcare provider.  Do pelvic muscle exercises often:  Your pelvic muscles help you stop urinating. Squeeze these muscles tight for 5 seconds, then relax for 5 seconds. Gradually work up to squeezing for 10 seconds. Do 3 sets of 15 repetitions a day, or as directed. This will help strengthen your pelvic muscles and improve bladder control.  Train your bladder:  Go to the bathroom at set times, such as every 2 hours, even if you do not feel the urge to go. You can also try to hold your urine when you feel the urge to go. For example, hold your urine for 5 minutes when you feel the urge to go. As that becomes easier, hold your urine for 10 minutes.   Self-care:   Keep a UI record.  Write down how often you leak urine and how much you leak. Make a note of what you were doing when you leaked urine.  Drink liquids as directed. You may need to limit the amount of liquid you drink to help control your urine leakage. Do not drink any liquid right before you go to bed. Limit or do not have drinks that contain caffeine or alcohol.   Prevent constipation.  Eat a variety of high-fiber foods. Good examples are high-fiber cereals, beans, vegetables, and whole-grain breads. Walking is the best way to trigger your  intestines to have a bowel movement.  Exercise regularly and maintain a healthy weight.  Weight loss and exercise will decrease pressure on your bladder and help you control your leakage.   Use a catheter as directed  to help empty your bladder. A catheter is a tiny, plastic tube that is put into your bladder to drain your urine.   Go to behavior therapy as directed.  Behavior therapy may be used to help you learn to control your urge to urinate.    Weight Management   Why it is important to manage your weight:  Being overweight increases your risk of health conditions such as heart disease, high blood pressure, type 2 diabetes, and certain types of cancer. It can also increase your risk for osteoarthritis, sleep apnea, and other respiratory problems. Aim for a slow, steady weight loss. Even a small amount of weight loss can lower your risk of health problems.  How to lose weight safely:  A safe and healthy way to lose weight is to eat fewer calories and get regular exercise. You can lose up about 1 pound a week by decreasing the number of calories you eat by 500 calories each day.   Healthy meal plan for weight management:  A healthy meal plan includes a variety of foods, contains fewer calories, and helps you stay healthy. A healthy meal plan includes the following:  Eat whole-grain foods more often.  A healthy meal plan should contain fiber. Fiber is the part of grains, fruits, and vegetables that is not broken down by your body. Whole-grain foods are healthy and provide extra fiber in your diet. Some examples of whole-grain foods are whole-wheat breads and pastas, oatmeal, brown rice, and bulgur.  Eat a variety of vegetables every day.  Include dark, leafy greens such as spinach, kale, andreas greens, and mustard greens. Eat yellow and orange vegetables such as carrots, sweet potatoes, and winter squash.   Eat a variety of fruits every day.  Choose fresh or canned fruit (canned in its own juice or light syrup)  instead of juice. Fruit juice has very little or no fiber.  Eat low-fat dairy foods.  Drink fat-free (skim) milk or 1% milk. Eat fat-free yogurt and low-fat cottage cheese. Try low-fat cheeses such as mozzarella and other reduced-fat cheeses.  Choose meat and other protein foods that are low in fat.  Choose beans or other legumes such as split peas or lentils. Choose fish, skinless poultry (chicken or turkey), or lean cuts of red meat (beef or pork). Before you cook meat or poultry, cut off any visible fat.   Use less fat and oil.  Try baking foods instead of frying them. Add less fat, such as margarine, sour cream, regular salad dressing and mayonnaise to foods. Eat fewer high-fat foods. Some examples of high-fat foods include french fries, doughnuts, ice cream, and cakes.  Eat fewer sweets.  Limit foods and drinks that are high in sugar. This includes candy, cookies, regular soda, and sweetened drinks.  Exercise:  Exercise at least 30 minutes per day on most days of the week. Some examples of exercise include walking, biking, dancing, and swimming. You can also fit in more physical activity by taking the stairs instead of the elevator or parking farther away from stores. Ask your healthcare provider about the best exercise plan for you.      © Copyright [employer] 2018 Information is for End User's use only and may not be sold, redistributed or otherwise used for commercial purposes. All illustrations and images included in CareNotes® are the copyrighted property of [employer] or [employer]      Patient Education     Urinary incontinence in females   The Basics   Written by the doctors and editors at [facility]   What is urinary incontinence? -- Urinary incontinence is the medical term for when a person leaks urine or loses bladder control.  Incontinence is a very common problem, but it is not a normal part of aging. If you have this problem, there are treatments that can help. There are also things  "that you can do on your own to stop or reduce urine leakage so you don't have to \"just live with it.\"  What are the symptoms of incontinence? -- There are different types of incontinence. Each causes different symptoms. The 3 most common types are:   Stress incontinence - With stress incontinence, you leak urine when you laugh, cough, sneeze, or do anything that \"stresses\" the belly. Stress incontinence is most common in females, especially those who have had a baby.   Urgency incontinence - With urgency incontinence, you feel a strong need to urinate all of a sudden. This is also known as \"urge incontinence.\" Often, the \"urge\" is so strong that you can't make it to the bathroom in time. \"Overactive bladder\" is another term for having a sudden, frequent urge to urinate. People with overactive bladder might or might not actually leak urine.   Mixed incontinence - With mixed incontinence, you have symptoms of both stress and urgency incontinence.  Is there anything I can do on my own to feel better? -- Yes. Here are some things that can help reduce urine leaks:   Reduce the amount of liquid that you drink, especially a few hours before bed.   Cut down on any foods or drinks that make your symptoms worse. Some people find that alcohol, caffeine, or spicy or acidic foods irritate the bladder.   Try to lose weight, if you are overweight. Your doctor or nurse can help you do this in a healthy way.   If you have diabetes, keep your blood sugar as close to your goal level as possible.   If you take medicines called diuretics, plan ahead. These medicines increase the need to urinate. Try to take them when you know you will be near a bathroom for a few hours. If you keep having problems with leakage because of diuretics, ask your doctor if you can take a lower dose or switch to a different medicine.  These techniques can also help improve bladder control:   Bladder retraining - During bladder retraining, you go to the " "bathroom at scheduled times. For instance, you might decide that you will go every hour. Make yourself go every hour, even if you don't feel like you need to. Try to wait the whole hour, even if you need to go sooner. Then, once you get used to going every hour, increase the amount of time you wait in between bathroom visits. Over time, you might be able to \"retrain\" your bladder to wait 3 or 4 hours between bathroom visits.   Pelvic floor muscle training - This involves learning exercises to strengthen and relax your pelvic muscles. These include the muscles that control the flow of urine and bowel movements. When done right, these exercises can help. But people often do them wrong. Ask your doctor or nurse how to do them right. Your doctor might suggest working with a physical therapist who has special training in these exercises.  Should I see my doctor or nurse? -- Yes. Your doctor or nurse can find out what might be causing your incontinence. They can also suggest ways to relieve the problem.  When you speak to your doctor or nurse, ask if any of the medicines you take could be causing your symptoms. Some medicines can cause incontinence or make it worse.  Some people choose to wear pads or special underwear. These can help if you accidentally leak urine once in a while. But they can also cause skin irritation if you use them a lot. If you have incontinence, ask your doctor or nurse how to treat it.  How is incontinence treated? -- The treatment options differ depending on what type of incontinence you have. Some of the options include:   Medicines to relax the bladder   Surgery to repair the tissues that support the bladder or to improve the flow of urine   Electrical stimulation of the nerves that relax the bladder  Urinary incontinence is more common in people who have been through menopause. (Menopause is when you stop having monthly periods). Some people have vaginal dryness after menopause. If this is the " case for you, a treatment called vaginal estrogen might help.  What will my life be like? -- Many people with incontinence can regain bladder control or at least reduce the amount of leakage they have. The most important thing is to tell your doctor or nurse. Then, work with them to find an approach that helps you.  All topics are updated as new evidence becomes available and our peer review process is complete.  This topic retrieved from [employer] on: Feb 26, 2024.  Topic 02777 Version 18.0  Release: 32.2.4 - C32.56  © 2024 UpToDate, Inc. and/or its affiliates. All rights reserved.  figure 1: Location of the bladder     This drawing shows the side view of a woman's body. The bladder is in front of the vagina. The urethra is the tube that carries urine from the bladder out of the body.  Graphic 024538 Version 1.0  Consumer Information Use and Disclaimer   Disclaimer: This generalized information is a limited summary of diagnosis, treatment, and/or medication information. It is not meant to be comprehensive and should be used as a tool to help the user understand and/or assess potential diagnostic and treatment options. It does NOT include all information about conditions, treatments, medications, side effects, or risks that may apply to a specific patient. It is not intended to be medical advice or a substitute for the medical advice, diagnosis, or treatment of a health care provider based on the health care provider's examination and assessment of a patient's specific and unique circumstances. Patients must speak with a health care provider for complete information about their health, medical questions, and treatment options, including any risks or benefits regarding use of medications. This information does not endorse any treatments or medications as safe, effective, or approved for treating a specific patient. Up[employer]Date, Inc. and its affiliates disclaim any warranty or liability relating to this information or the  use thereof.The use of this information is governed by the Terms of Use, available at https://www.wolterskluwer.com/en/know/clinical-effectiveness-terms. 2024© [employer]Date, Inc. and its affiliates and/or licensors. All rights reserved.  Copyright   © 2024 [employer] and/or its affiliates. All rights reserved.

## 2024-08-06 NOTE — PROGRESS NOTES
Ambulatory Visit  Name: Jerardo Sanon      : 1960      MRN: [id number]  Encounter Provider: Rebeca Obregon PA-C  Encounter Date: 2024   Encounter department: [facility]    Assessment & Plan     AWV - conducted  Hypothyroid - TSH WNL, continue levothyroxine 88 mcg  Hyperlipidemia - well controlled on tricor 145 mg daily    Mammo ordered  Cologard ordered  Will check on prevnar, thinks she had it at [facility]    F/u 1 year or sooner if needed       Preventive health issues were discussed with patient, and age appropriate screening tests were ordered as noted in patient's After Visit Summary. Personalized health advice and appropriate referrals for health education or preventive services given if needed, as noted in patient's After Visit Summary.    History of Present Illness     HPI   Patient Care Team:  Rebeca Obregon PA-C as PCP - General (Family Medicine)  Rebeca Obregon PA-C    Review of Systems   Constitutional: Negative.    HENT: Negative.     Eyes: Negative.    Respiratory: Negative.     Cardiovascular: Negative.    Gastrointestinal: Negative.    Endocrine: Negative.    Genitourinary: Negative.    Musculoskeletal: Negative.    Skin: Negative.    Allergic/Immunologic: Negative.    Neurological: Negative.    Hematological: Negative.    Psychiatric/Behavioral: Negative.       Medical History Reviewed by provider this encounter:       Annual Wellness Visit Questionnaire   Jerardo is here for her Welcome to Medicare visit.     Health Risk Assessment:   Patient rates overall health as very good. Patient feels that their physical health rating is same. Patient is satisfied with their life. Eyesight was rated as same. Hearing was rated as same. Patient feels that their emotional and mental health rating is same. Patients states they are never, rarely angry. Patient states they are sometimes unusually tired/fatigued. Pain experienced in the last 7 days  has been none. Patient states that she has experienced weight loss or gain in last 6 months. Lyme disease and covid reduced appetite    Fall Risk Screening:   In the past year, patient has experienced: no history of falling in past year      Urinary Incontinence Screening:   Patient has leaked urine accidently in the last six months.     Home Safety:  Patient does not have trouble with stairs inside or outside of their home. Patient has working smoke alarms and has working carbon monoxide detector. Home safety hazards include: none.     Nutrition:   Current diet is Regular.     Medications:   Patient is currently taking over-the-counter supplements. OTC medications include: see medication list. Patient is able to manage medications.     Activities of Daily Living (ADLs)/Instrumental Activities of Daily Living (IADLs):   Walk and transfer into and out of bed and chair?: Yes  Dress and groom yourself?: Yes    Bathe or shower yourself?: Yes    Feed yourself? Yes  Do your laundry/housekeeping?: Yes  Manage your money, pay your bills and track your expenses?: Yes  Make your own meals?: Yes    Do your own shopping?: Yes    Previous Hospitalizations:   Any hospitalizations or ED visits within the last 12 months?: Yes    How many hospitalizations have you had in the last year?: 1-2    Hospitalization Comments: [facility] for lyme disease    Advance Care Planning:   Living will: Yes    Durable POA for healthcare: Yes    Advanced directive: Yes    End of Life Decisions reviewed with patient: Yes    Provider agrees with end of life decisions: Yes      Cognitive Screening:   Provider or family/friend/caregiver concerned regarding cognition?: No    PREVENTIVE SCREENINGS      Cardiovascular Screening:    General: Screening Not Indicated, History Lipid Disorder and Screening Current      Diabetes Screening:     General: Screening Current      Breast Cancer Screening:     General: Screening Current      Cervical Cancer Screening:     General: Screening Not Indicated      Osteoporosis Screening:    General: Screening Current      Abdominal Aortic Aneurysm (AAA) Screening:        General: Screening Not Indicated      Lung Cancer Screening:     General: Screening Not Indicated      Hepatitis C Screening:    General: Screening Current    Screening, Brief Intervention, and Referral to Treatment (SBIRT)    Screening  Typical number of drinks in a day: 0  Typical number of drinks in a week: 1  Interpretation: Low risk drinking behavior.    AUDIT-C Screenin) How often did you have a drink containing alcohol in the past year? monthly or less  2) How many drinks did you have on a typical day when you were drinking in the past year? 1 to 2  3) How often did you have 6 or more drinks on one occasion in the past year? never    AUDIT-C Score: 1  Interpretation: Score 0-2 (female): Negative screen for alcohol misuse    Single Item Drug Screening:  How often have you used an illegal drug (including marijuana) or a prescription medication for non-medical reasons in the past year? never    Single Item Drug Screen Score: 0  Interpretation: Negative screen for possible drug use disorder    Social Determinants of Health     Food Insecurity: No Food Insecurity (2024)    Hunger Vital Sign     Worried About Running Out of Food in the Last Year: Never true     Ran Out of Food in the Last Year: Never true   Transportation Needs: No Transportation Needs (2024)    PRAPARE - Transportation     Lack of Transportation (Medical): No     Lack of Transportation (Non-Medical): No   Housing Stability: Low Risk  (2024)    Housing Stability Vital Sign     Unable to Pay for Housing in the Last Year: No     Number of Times Moved in the Last Year: 0     Homeless in the Last Year: No   Utilities: Not At Risk (2024)    [facility] Utilities     Threatened with loss of utilities: No     No results found.    Objective     There were no vitals taken for this visit.    Physical  Exam  Constitutional:       Appearance: Normal appearance. She is well-developed and normal weight.   HENT:      Head: Normocephalic and atraumatic.      Right Ear: Hearing normal.      Left Ear: Hearing normal.      Mouth/Throat:      Pharynx: Uvula midline.   Eyes:      Extraocular Movements: Extraocular movements intact.      Conjunctiva/sclera: Conjunctivae normal.      Pupils: Pupils are equal, round, and reactive to light.   Neck:      Thyroid: No thyromegaly.   Cardiovascular:      Rate and Rhythm: Normal rate and regular rhythm.      Pulses: Normal pulses.      Heart sounds: Normal heart sounds. No murmur heard.  Pulmonary:      Effort: Pulmonary effort is normal.      Breath sounds: Normal breath sounds.   Abdominal:      General: Abdomen is flat. Bowel sounds are normal. There is no distension.      Palpations: Abdomen is soft. There is no mass.      Tenderness: There is no abdominal tenderness.   Musculoskeletal:         General: Normal range of motion.      Cervical back: Normal range of motion and neck supple.   Lymphadenopathy:      Cervical: No cervical adenopathy.   Skin:     General: Skin is warm.   Neurological:      General: No focal deficit present.      Mental Status: She is alert and oriented to person, place, and time.      Cranial Nerves: No cranial nerve deficit.      Deep Tendon Reflexes: Reflexes normal.   Psychiatric:         Mood and Affect: Mood normal.         Behavior: Behavior normal.         Thought Content: Thought content normal.         Judgment: Judgment normal.

## 2024-08-07 ENCOUNTER — OFFICE VISIT (OUTPATIENT)
Dept: PHYSICAL THERAPY | Facility: CLINIC | Age: 64
End: 2024-08-07
Payer: MEDICARE

## 2024-08-07 DIAGNOSIS — M54.50 LOW BACK PAIN RADIATING TO RIGHT LEG: Primary | ICD-10-CM

## 2024-08-07 DIAGNOSIS — M79.604 LOW BACK PAIN RADIATING TO RIGHT LEG: Primary | ICD-10-CM

## 2024-08-07 PROCEDURE — 97110 THERAPEUTIC EXERCISES: CPT

## 2024-08-07 PROCEDURE — 97112 NEUROMUSCULAR REEDUCATION: CPT

## 2024-08-07 NOTE — PROGRESS NOTES
"Daily Note     Today's date: 2024  Patient name: Jerardo Sanon  : 1960  MRN: [id number]  Referring provider: Rebeca Obregon P*  Dx:   Encounter Diagnosis     ICD-10-CM    1. Low back pain radiating to right leg  M54.50     M79.604                      Subjective: Patient reports she has started going to the [facility] to exercise. This is going well.     Objective: See treatment diary below      Assessment: Patient tolerated treatment well. Progressed balance with SLS on foam which challenged her balance more. Added hurdles to step over when sidestepping on foam. She will benefit from skilled PT to progress strength and begin transitioning to HEP over the next month.       Plan: continue focusing on progressing strength in RLE; progress single leg balance on RLE     Precautions: Bell's Palsy, Lyme Disease  Functional Limitations: prolonged sitting, walking 1-2 miles at brisk pace, gardening, stairs  Impairments: Lumbar ROM, RLE strength  [facility] [email or web address]   POC expiration: 2024    Manuals                                          Neuro Re-Ed         SLS  10\"x5 10\"x5 Green foam 15\"x3     Supine 90/90 sciatic nerve glide         Hooklying TA         Hooklying march         Sidestep foam  3 laps 3 laps 3 laps w/ hurdles     Tandem walk foam  3 laps 3 laps 3 laps     TB 4 way kick OTB 10ea GTB 10ea  BTB 10ea BTB 10ea     Forward hurdles         Ther Ex         bike 6' np 6' 6'     Standing lumbar ext         LTR  10ea 10ea 10ea     Open book         Supine SLR 2# 2x10 2# 2x10 2# 2x10 2# 2x10     Sidelying hip abd         bridge 2x10 2x10 2x10 2x10     Seated TR         Standing TR leaning against wall 2x10        Standing gastroc strectch 30\"x3 30\"x3 30\"x3 30\"x3     TB ankle DF         Leg press RLE 45# 2x10 45# 2x10 45# 3x10 55# 3x10     squats Slant 2x10 Slant 2x10 Slant 2x10 BOSU 2x10     Fwd lunge 10ea 10ea 10ea 10ea     Lat lunge 10 ea 10ea 10ea 10ea     Seated leg curl " "machine 35# 2x10 35# 2x10 35# 2x10 45# 2x10     Seated leg ext machine 35# 2x10 35# 2x10 35# 2x10 45# 2x10     Ther Activity         Fwd step up 8\" 2x10 ea 8\" 2x10 ea 8\" 2x10 ea 8\" 2x10 ea     Sit to stands   2x5 2x5     Gait Training                           Modalities                                  "

## 2024-08-14 ENCOUNTER — OFFICE VISIT (OUTPATIENT)
Dept: PHYSICAL THERAPY | Facility: CLINIC | Age: 64
End: 2024-08-14
Payer: MEDICARE

## 2024-08-14 DIAGNOSIS — M54.50 LOW BACK PAIN RADIATING TO RIGHT LEG: Primary | ICD-10-CM

## 2024-08-14 DIAGNOSIS — M79.604 LOW BACK PAIN RADIATING TO RIGHT LEG: Primary | ICD-10-CM

## 2024-08-14 PROCEDURE — 97110 THERAPEUTIC EXERCISES: CPT

## 2024-08-14 PROCEDURE — 97112 NEUROMUSCULAR REEDUCATION: CPT

## 2024-08-14 NOTE — PROGRESS NOTES
"Daily Note     Today's date: 2024  Patient name: Jerardo Sanon  : 1960  MRN: [id number]  Referring provider: Rebeca Obregon P*  Dx:   Encounter Diagnosis     ICD-10-CM    1. Low back pain radiating to right leg  M54.50     M79.604                      Subjective: Patient reports noticing her foot feeling more normal when walking but then when walking longer she feels she gets weaker and then her foot starts to flop a little    Objective: See treatment diary below      Assessment: Patient tolerated treatment well. Continued with working on balance and strengthening today. She has good exercise recall and technique and is on track for planned discharge in 2 weeks. Will re-evaluate in 2 weeks as well      Plan: continue focusing on progressing strength in RLE; progress single leg balance on RLE; re-eval and discharge in 2 weeks      Precautions: Bell's Palsy, Lyme Disease  Functional Limitations: prolonged sitting, walking 1-2 miles at brisk pace, gardening, stairs  Impairments: Lumbar ROM, RLE strength  [facility] [email or web address]   POC expiration: 2024    Manuals                                              Neuro Re-Ed          SLS  10\"x5 10\"x5 Green foam 15\"x3 Green foam 15\"x3     Supine 90/90 sciatic nerve glide          Hooklying TA          Hooklying march          Sidestep foam  3 laps 3 laps 3 laps w/ hurdles 3 laps w/ hurdles     Tandem walk foam  3 laps 3 laps 3 laps 3 laps     TB 4 way kick OTB 10ea GTB 10ea  BTB 10ea BTB 10ea BTB 10ea     Forward hurdles          Ther Ex          bike 6' np 6' 6' 6'     Standing lumbar ext          LTR  10ea 10ea 10ea 10ea     Open book          Supine SLR 2# 2x10 2# 2x10 2# 2x10 2# 2x10 2# 2x10     Sidelying hip abd          bridge 2x10 2x10 2x10 2x10 SL 2x10ea     Seated TR          Standing TR leaning against wall 2x10         Standing gastroc strectch 30\"x3 30\"x3 30\"x3 30\"x3 30\"x3     TB ankle DF          Leg press RLE 45# 2x10 " "45# 2x10 45# 3x10 55# 3x10 55# 3x10     squats Slant 2x10 Slant 2x10 Slant 2x10 BOSU 2x10 BOSU 2x10     Fwd lunge 10ea 10ea 10ea 10ea 10ea     Lat lunge 10 ea 10ea 10ea 10ea 10ea     Seated leg curl machine 35# 2x10 35# 2x10 35# 2x10 45# 2x10 45# 2x10     Seated leg ext machine 35# 2x10 35# 2x10 35# 2x10 45# 2x10 45# 2x10     Ther Activity          Fwd step up 8\" 2x10 ea 8\" 2x10 ea 8\" 2x10 ea 8\" 2x10 ea D/c     Sit to stands   2x5 2x5 15x     Gait Training                              Modalities                                     "

## 2024-08-18 DIAGNOSIS — E78.1 ESSENTIAL HYPERTRIGLYCERIDEMIA: ICD-10-CM

## 2024-08-18 DIAGNOSIS — E03.9 HYPOTHYROIDISM, UNSPECIFIED TYPE: ICD-10-CM

## 2024-08-18 RX ORDER — FENOFIBRATE 145 MG/1
145 TABLET, COATED ORAL DAILY
Qty: 90 TABLET | Refills: 1 | Status: SHIPPED | OUTPATIENT
Start: 2024-08-18

## 2024-08-18 RX ORDER — LEVOTHYROXINE SODIUM 88 UG/1
88 TABLET ORAL DAILY
Qty: 90 TABLET | Refills: 1 | Status: SHIPPED | OUTPATIENT
Start: 2024-08-18

## 2024-08-19 LAB — COLOGUARD RESULT REPORTABLE: NORMAL

## 2024-08-20 ENCOUNTER — HOSPITAL ENCOUNTER (OUTPATIENT)
Dept: CT IMAGING | Facility: HOSPITAL | Age: 64
Discharge: HOME | End: 2024-08-20
Payer: MEDICARE

## 2024-08-20 DIAGNOSIS — F17.211 NICOTINE DEPENDENCE, CIGARETTES, IN REMISSION: ICD-10-CM

## 2024-08-20 PROCEDURE — 71271 CT THORAX LUNG CANCER SCR C-: CPT

## 2024-08-21 ENCOUNTER — OFFICE VISIT (OUTPATIENT)
Dept: PHYSICAL THERAPY | Facility: CLINIC | Age: 64
End: 2024-08-21
Payer: MEDICARE

## 2024-08-21 DIAGNOSIS — M54.50 LOW BACK PAIN RADIATING TO RIGHT LEG: Primary | ICD-10-CM

## 2024-08-21 DIAGNOSIS — M79.604 LOW BACK PAIN RADIATING TO RIGHT LEG: Primary | ICD-10-CM

## 2024-08-21 PROCEDURE — 97110 THERAPEUTIC EXERCISES: CPT

## 2024-08-21 PROCEDURE — 97112 NEUROMUSCULAR REEDUCATION: CPT

## 2024-08-21 NOTE — PROGRESS NOTES
"Daily Note     Today's date: 2024  Patient name: Jerardo Sanon  : 1960  MRN: [id number]  Referring provider: Rebeca Obregon P*  Dx:   Encounter Diagnosis     ICD-10-CM    1. Low back pain radiating to right leg  M54.50     M79.604                      Subjective: Patient reports still doing well. She has been going to the [facility] a few days a week without any difficulty or problems    Objective: See treatment diary below      Assessment: Patient tolerated treatment well. Reviewed single leg bridge to ensure proper technique. She is progressing well with exercises for strengthening. Will re-evaluate and discharge next visit.      Plan: re-eval and discharge next visit     Precautions: Bell's Palsy, Lyme Disease  Functional Limitations: prolonged sitting, walking 1-2 miles at brisk pace, gardening, stairs  Impairments: Lumbar ROM, RLE strength  [facility] Code: [email or web address]   POC expiration: 2024    Manuals                                             Neuro Re-Ed          SLS  10\"x5 10\"x5 Green foam 15\"x3 Green foam 15\"x3 Green foam 15\"x3    Supine 90/90 sciatic nerve glide          Hooklying TA          Hooklying march          Sidestep foam  3 laps 3 laps 3 laps w/ hurdles 3 laps w/ hurdles 3 laps w/ hurdles    Tandem walk foam  3 laps 3 laps 3 laps 3 laps 3 laps and 2 laps w/ hurdles    TB 4 way kick OTB 10ea GTB 10ea  BTB 10ea BTB 10ea BTB 10ea BTB 10ea    Forward hurdles          Ther Ex          bike 6' np 6' 6' 6' 6'    Standing lumbar ext          LTR  10ea 10ea 10ea 10ea 10ea    Open book          Supine SLR 2# 2x10 2# 2x10 2# 2x10 2# 2x10 2# 2x10 2.5# 2x10    Sidelying hip abd          bridge 2x10 2x10 2x10 2x10 SL 2x10ea SL 2x10 ea    Seated TR          Standing TR leaning against wall 2x10         Standing gastroc strectch 30\"x3 30\"x3 30\"x3 30\"x3 30\"x3     TB ankle DF          Leg press RLE 45# 2x10 45# 2x10 45# 3x10 55# 3x10 55# 3x10 55# 3x10    squats Slant 2x10 " "Slant 2x10 Slant 2x10 BOSU 2x10 BOSU 2x10 BOSU 2x10    Fwd lunge 10ea 10ea 10ea 10ea 10ea 10ea    Lat lunge 10 ea 10ea 10ea 10ea 10ea 10ea    Seated leg curl machine 35# 2x10 35# 2x10 35# 2x10 45# 2x10 45# 2x10 45# 2x10    Seated leg ext machine 35# 2x10 35# 2x10 35# 2x10 45# 2x10 45# 2x10 45# 2x10    Ther Activity          Fwd step up 8\" 2x10 ea 8\" 2x10 ea 8\" 2x10 ea 8\" 2x10 ea D/c     Sit to stands   2x5 2x5 15x 15x    Gait Training                              Modalities                                     "

## 2024-08-28 ENCOUNTER — EVALUATION (OUTPATIENT)
Dept: PHYSICAL THERAPY | Facility: CLINIC | Age: 64
End: 2024-08-28
Payer: MEDICARE

## 2024-08-28 DIAGNOSIS — M79.604 LOW BACK PAIN RADIATING TO RIGHT LEG: Primary | ICD-10-CM

## 2024-08-28 DIAGNOSIS — M54.50 LOW BACK PAIN RADIATING TO RIGHT LEG: Primary | ICD-10-CM

## 2024-08-28 PROCEDURE — 97110 THERAPEUTIC EXERCISES: CPT

## 2024-08-28 NOTE — PROGRESS NOTES
PT Re-Evaluation     Today's date: 2024  Patient name: Jerardo Sanon  : 1960  MRN: [id number]  Referring provider: Rebeca Obregon P*  Dx:   Encounter Diagnosis     ICD-10-CM    1. Low back pain radiating to right leg  M54.50     M79.604                      Assessment  Impairments: abnormal or restricted ROM, activity intolerance, impaired physical strength, lacks appropriate home exercise program and pain with function    Assessment details: Jerardo Sanon is a pleasant 64 y.o. female who presents with R sided LBP with R sided radiating pain/parasthesias beginning about a month ago. She presents with mildly limited lumbar ROM and mildly decreased R shoulder strength. Note (+) slump on R. These impairments are limiting her with walking 1-2 miles each day, gardening, sleeping, and prolonged sitting. hese signs and symptoms are consistent with Low back pain radiating to right leg. She will benefit from skilled PT to address impairments and return to PLOF. No referral is necessary    2024: Patient has made good progress in lumbar ROM and BLE strength since beginning skilled PT but continues to be limited with ankle DF strength, hip flexion strength, and glute strength. She has decreased BLE strength noted with decreased 5x sit to stand time as well as decreased endurance with walking noted with decreased distance with the 6 minute walk test compared to age related normative values. These impairments are limiting her with standing to cook longer than 30 minutes, going on 1-2 mile walks, stairs. Will benefit from skilled PT to address impairments and return to PLOF.     2024: Patient has made excellent progress since last re-evaluation on 2024. She has met age related normative values for her 6 minute walk test, 5x sit to stand, and 30 second sit to stand. She has improved RLE strength as well. She is no longer limited in her daily activities, household chores, or exercise. At this time she  is being discharged to independent [facility]      Prognosis details: Positive prognostic indicators include positive attitude toward recovery, motivated to improve, high self-efficacy, good understanding of condition, realistic expectations.  Negative prognostic indicators include high symptom irritability    Goals  STG to be achieved in 4 weeks  Patient will have improved lumbar ROM in all planes to WNLs (met)  Patient will have improved gross BLE strength to 5/5(good progress)  Able to walk 1 mile each day (met)  Patient will be independent with HEP.(Met)    LTG to be achieved in 8 weeks  Patient will be able to walk 2 miles (good progress)  Patient will be able to ascend/descend stairs(met)  Patient will be able to return to gardening(met)    New STG made on 7/17 to be met in 4 weeks  5x time sit to stand improved to 10 seconds(met)  6 minute walk test improved to at least 538 meters(met)    Plan  Patient would benefit from: skilled physical therapy  Planned modality interventions: cryotherapy and thermotherapy: hydrocollator packs    Planned therapy interventions: balance, home exercise program, gait training, functional ROM exercises, body mechanics training, joint mobilization, manual therapy, neuromuscular re-education, therapeutic exercise, therapeutic activities, stretching, strengthening, flexibility and patient/caregiver education    Plan of Care beginning date: 7/17/2024  Plan of Care expiration date: 9/11/2024  Treatment plan discussed with: patient and PTA  Plan details: Discharged to independent [facility]        Subjective Evaluation    History of Present Illness  Mechanism of injury: Patient reports R LBP and R buttock pain beginning about 3 weeks ago. About 2 weeks ago she had facial droop and went to ED - the assessed her to rule out stroke. She was diagnosed with Bell's Palsy and Lyme Disease.     Her pain is better with walking. Notes sitting bothers her. Lying down relieves pain. Is having trouble with her  balance due to the eye from Bell's Palsy. Pain wakes her up at night. Notes increased pain with stairs and feels her R leg is weaker. She does try stretching by bending forward. She does get numbness into her R leg - posterior lateral thigh into R lower leg that is worse in the morning and gets better with movement    On average she walks about 7,000 steps a day.     Notes she used to walk 1-2 miles a day - would like to get back to do this. Notes she did lose 10 pounds. She wants to get back to gardening.     2024: Patient notes sometimes having R buttock pain in the morning but this gets better as she gets up and moves around. She feels her R leg isn't as strong yet. Swims for 30 minutes each day. Fatigue with standing to cook more than 30 minutes and with doing a lot of other housework or walking. Notes still feeling her foot doesn't have good control with walking    2024: Patient has not had any R leg pain. Still reports some weakness in her R ankle that is improving. She has been swimming, going to the [employer], walking 1.5 miles, and doing her normal household chores. Is confident to work on her exercises at this time    Patient Goals  Patient goal: Patient wants to be able to returning to walking, build up her endurance, return to gardening  Pain  Current pain ratin  At best pain ratin  At worst pain ratin  Relieving factors: heat, ice and medications        Objective     Active Range of Motion     Lumbar   Flexion:  WFL  Extension:  WFL    Additional Active Range of Motion Details  Bilateral sideglide: WNLs, painfree    Strength/Myotome Testing     Left Hip   Planes of Motion   Flexion: 4+    Right Hip   Planes of Motion   Flexion: 4+    Left Knee   Flexion: 4+  Extension: 5    Right Knee   Flexion: 4+  Extension: 5    Left Ankle/Foot   Dorsiflexion: 5  Inversion: 5    Right Ankle/Foot   Dorsiflexion: 4+  Inversion: 5    Tests     Lumbar     Right   Negative slump test.     Functional  "Assessment        Comments  1589 feet (484 meters)    6 min walk test on 8/28: 1820 feet (554 meters)  5x sit to stand on 8/28: 8.1 seconds  30 second sit to stand on 8/28: 17x             Precautions: Bell's Palsy, Lyme Disease  Functional Limitations: prolonged sitting, walking 1-2 miles at brisk pace, gardening, stairs  Impairments: Lumbar ROM, RLE strength  [facility] Code: [email or web address]   POC expiration:     Manuals 7/24 7/29 7/31 8/7 8/14 8/21 8/28                                           Neuro Re-Ed          SLS  10\"x5 10\"x5 Green foam 15\"x3 Green foam 15\"x3 Green foam 15\"x3    Supine 90/90 sciatic nerve glide          Hooklying TA          Hooklying march          Sidestep foam  3 laps 3 laps 3 laps w/ hurdles 3 laps w/ hurdles 3 laps w/ hurdles    Tandem walk foam  3 laps 3 laps 3 laps 3 laps 3 laps and 2 laps w/ hurdles    TB 4 way kick OTB 10ea GTB 10ea  BTB 10ea BTB 10ea BTB 10ea BTB 10ea    Forward hurdles          Ther Ex          bike 6' np 6' 6' 6' 6' 6'   Standing lumbar ext          LTR  10ea 10ea 10ea 10ea 10ea    Open book          Supine SLR 2# 2x10 2# 2x10 2# 2x10 2# 2x10 2# 2x10 2.5# 2x10    Sidelying hip abd          bridge 2x10 2x10 2x10 2x10 SL 2x10ea SL 2x10 ea    Seated TR          Standing TR leaning against wall 2x10         Standing gastroc strectch 30\"x3 30\"x3 30\"x3 30\"x3 30\"x3     TB ankle DF          Leg press RLE 45# 2x10 45# 2x10 45# 3x10 55# 3x10 55# 3x10 55# 3x10    squats Slant 2x10 Slant 2x10 Slant 2x10 BOSU 2x10 BOSU 2x10 BOSU 2x10    Fwd lunge 10ea 10ea 10ea 10ea 10ea 10ea    Lat lunge 10 ea 10ea 10ea 10ea 10ea 10ea    Seated leg curl machine 35# 2x10 35# 2x10 35# 2x10 45# 2x10 45# 2x10 45# 2x10    Seated leg ext machine 35# 2x10 35# 2x10 35# 2x10 45# 2x10 45# 2x10 45# 2x10    Ther Activity          Fwd step up 8\" 2x10 ea 8\" 2x10 ea 8\" 2x10 ea 8\" 2x10 ea D/c     Sit to stands   2x5 2x5 15x 15x    Gait Training                              Modalities                            "

## 2024-08-29 LAB — COLOGUARD RESULT REPORTABLE: NEGATIVE
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
## 2024-09-09 DIAGNOSIS — S00.06XA TICK BITE OF SCALP, INITIAL ENCOUNTER: Primary | ICD-10-CM

## 2024-09-11 ENCOUNTER — TELEPHONE (OUTPATIENT)
Age: 64
End: 2024-09-11

## 2024-09-11 ENCOUNTER — APPOINTMENT (OUTPATIENT)
Dept: LAB | Facility: HOSPITAL | Age: 64
End: 2024-09-11
Payer: MEDICARE
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
## 2024-09-11 DIAGNOSIS — S00.06XA TICK BITE OF SCALP, INITIAL ENCOUNTER: ICD-10-CM

## 2024-09-11 PROCEDURE — 86618 LYME DISEASE ANTIBODY: CPT

## 2024-09-11 PROCEDURE — 86617 LYME DISEASE ANTIBODY: CPT

## 2024-09-11 PROCEDURE — 36415 COLL VENOUS BLD VENIPUNCTURE: CPT

## 2024-09-11 NOTE — TELEPHONE ENCOUNTER
Patient had called in stating that she had tested POSITIVE for lyme, and would like to get started on antibiotics as soon as possible.     Please have someone advise out to the patient with confirmation that something would be sent over.     Patient wants to get a head start on this, for it to not become worse.

## 2024-09-12 DIAGNOSIS — A69.20 LYME DISEASE: Primary | ICD-10-CM

## 2024-09-12 RX ORDER — DOXYCYCLINE 100 MG/1
100 CAPSULE ORAL EVERY 12 HOURS SCHEDULED
Qty: 28 CAPSULE | Refills: 0 | Status: SHIPPED | OUTPATIENT
Start: 2024-09-12 | End: 2024-09-26

## 2024-11-05 ENCOUNTER — HOSPITAL ENCOUNTER (OUTPATIENT)
Dept: MAMMOGRAPHY | Facility: IMAGING CENTER | Age: 64
Discharge: HOME | End: 2024-11-05
Payer: MEDICARE

## 2024-11-05 VITALS — WEIGHT: 167 LBS | BODY MASS INDEX: 26.21 KG/M2 | HEIGHT: 67 IN

## 2024-11-05 DIAGNOSIS — Z12.31 ENCOUNTER FOR SCREENING MAMMOGRAM FOR BREAST CANCER: ICD-10-CM

## 2024-11-05 PROCEDURE — 77063 BREAST TOMOSYNTHESIS BI: CPT

## 2024-11-05 PROCEDURE — 77067 SCR MAMMO BI INCL CAD: CPT

## 2025-02-21 DIAGNOSIS — E78.1 ESSENTIAL HYPERTRIGLYCERIDEMIA: ICD-10-CM

## 2025-02-21 DIAGNOSIS — E03.9 HYPOTHYROIDISM, UNSPECIFIED TYPE: ICD-10-CM

## 2025-02-21 RX ORDER — LEVOTHYROXINE SODIUM 88 UG/1
88 TABLET ORAL DAILY
Qty: 90 TABLET | Refills: 1 | Status: SHIPPED | OUTPATIENT
Start: 2025-02-21

## 2025-02-21 RX ORDER — FENOFIBRATE 145 MG/1
145 TABLET, COATED ORAL DAILY
Qty: 60 TABLET | Refills: 0 | Status: SHIPPED | OUTPATIENT
Start: 2025-02-21

## 2025-02-21 NOTE — TELEPHONE ENCOUNTER
Patient needs updated blood work. Please place orders(LIPID,CBC). A courtesy refill was provided.

## 2025-04-03 ENCOUNTER — OFFICE VISIT (OUTPATIENT)
Dept: FAMILY MEDICINE CLINIC | Facility: CLINIC | Age: 65
End: 2025-04-03
Payer: MEDICARE

## 2025-04-03 VITALS
HEIGHT: 67 IN | OXYGEN SATURATION: 98 % | WEIGHT: 168.6 LBS | SYSTOLIC BLOOD PRESSURE: 126 MMHG | RESPIRATION RATE: 14 BRPM | DIASTOLIC BLOOD PRESSURE: 86 MMHG | BODY MASS INDEX: 26.46 KG/M2 | HEART RATE: 56 BPM | TEMPERATURE: 97.8 F

## 2025-04-03 DIAGNOSIS — M79.672 LEFT FOOT PAIN: Primary | ICD-10-CM

## 2025-04-03 PROCEDURE — G2211 COMPLEX E/M VISIT ADD ON: HCPCS | Performed by: NURSE PRACTITIONER

## 2025-04-03 PROCEDURE — 99213 OFFICE O/P EST LOW 20 MIN: CPT | Performed by: NURSE PRACTITIONER

## 2025-04-03 RX ORDER — SODIUM FLUORIDE 6 MG/ML
PASTE, DENTIFRICE DENTAL
COMMUNITY
Start: 2025-02-19

## 2025-04-03 NOTE — PROGRESS NOTES
"Name: Jerardo Sanon      : 1960      MRN: [id number]  Encounter Provider: PIO Metcalf  Encounter Date: 4/3/2025   Encounter department: [facility]  :  Assessment & Plan  Left foot pain       Pain most likely related to footwear. Pt may follow RICE protocol and continue to wear new shoes which she has not had a problem with. Patient is encouraged to call our office for any questions/concerns, persistent or worsening symptoms. Patient states they understand and agree with treatment plan.         Pt to f/u PRN.         History of Present Illness   Pt presents today for left foot pain that started on /Monday.  She did play pickle ball on Saturday for several hours.  She has been doing so for months.  Denies discoloration, numbness/tingling, pain, swelling.  She notes her pain is an ache and rates it 4/10.  Pain is to plantar side of foot at base of #3 & #4 toes.  She notes the pain can be all day.  She notes her pain is better w/ activity.  She notes she is wearing old sneakers and believes this is the cause.      Review of Systems  As noted per HPI.  Objective   /86   Pulse 56   Temp 97.8 °F (36.6 °C)   Resp 14   Ht 5' 7\" (1.702 m)   Wt 76.5 kg (168 lb 9.6 oz)   SpO2 98%   BMI 26.41 kg/m²      Physical Exam  Constitutional:       Appearance: Normal appearance.   Cardiovascular:      Pulses:           Dorsalis pedis pulses are 2+ on the left side.   Pulmonary:      Effort: Pulmonary effort is normal.   Musculoskeletal:        Feet:    Feet:      Left foot:      Skin integrity: Skin integrity normal.   Skin:     Capillary Refill: Capillary refill takes less than 2 seconds.   Neurological:      Mental Status: She is alert and oriented to person, place, and time. Mental status is at baseline.   Psychiatric:         Mood and Affect: Mood normal.         Behavior: Behavior normal.         Thought Content: Thought content normal.         " Judgment: Judgment normal.

## 2025-04-19 DIAGNOSIS — E78.1 ESSENTIAL HYPERTRIGLYCERIDEMIA: ICD-10-CM

## 2025-04-21 RX ORDER — FENOFIBRATE 145 MG/1
145 TABLET, FILM COATED ORAL DAILY
Qty: 90 TABLET | Refills: 1 | Status: SHIPPED | OUTPATIENT
Start: 2025-04-21

## 2025-07-11 ENCOUNTER — OFFICE VISIT (OUTPATIENT)
Dept: FAMILY MEDICINE CLINIC | Facility: CLINIC | Age: 65
End: 2025-07-11
Payer: MEDICARE

## 2025-07-11 VITALS
WEIGHT: 168 LBS | HEART RATE: 55 BPM | TEMPERATURE: 97.8 F | DIASTOLIC BLOOD PRESSURE: 86 MMHG | HEIGHT: 67 IN | RESPIRATION RATE: 16 BRPM | OXYGEN SATURATION: 98 % | BODY MASS INDEX: 26.37 KG/M2 | SYSTOLIC BLOOD PRESSURE: 132 MMHG

## 2025-07-11 DIAGNOSIS — D22.9 ATYPICAL MOLE: Primary | ICD-10-CM

## 2025-07-11 PROCEDURE — G2211 COMPLEX E/M VISIT ADD ON: HCPCS | Performed by: STUDENT IN AN ORGANIZED HEALTH CARE EDUCATION/TRAINING PROGRAM

## 2025-07-11 PROCEDURE — 99213 OFFICE O/P EST LOW 20 MIN: CPT | Performed by: STUDENT IN AN ORGANIZED HEALTH CARE EDUCATION/TRAINING PROGRAM

## 2025-07-11 NOTE — PROGRESS NOTES
"Name: Jerardo Sanon      : 1960      MRN: [id number]  Encounter Provider: Luz Fair MD  Encounter Date: 2025   Encounter department: [facility]  :  Assessment & Plan  Atypical mole  Given characteristics of site discussed with patient would like for her to see dermatology as soon as possible for further evaluation and biopsy  Orders:    Ambulatory Referral to Dermatology; Future         History of Present Illness   Patient is a 65-year-old female who presents to the office today for an acute visit.  Patient reports she had noticed a mole on her right clavicular area last week which has since been more noticeable red inflamed and itchy.  Denies any bleeding of site  Review of Systems   Skin:  Positive for color change.     Objective   /86   Pulse 55   Temp 97.8 °F (36.6 °C) (Temporal)   Resp 16   Ht 5' 7\" (1.702 m)   Wt 76.2 kg (168 lb)   SpO2 98%   BMI 26.31 kg/m²      Physical Exam  Vitals reviewed.     Skin:     Comments: 3 mm atypical mole with erythema     Neurological:      Mental Status: She is alert.   Administrative Statements   I have spent a total time of 20 minutes in caring for this patient on the day of the visit/encounter including Instructions for management, Patient and family education, Risk factor reductions, Documenting in the medical record, and Obtaining or reviewing history  .  "

## 2025-07-15 ENCOUNTER — CONSULT (OUTPATIENT)
Age: 65
End: 2025-07-15
Payer: MEDICARE

## 2025-07-15 VITALS — BODY MASS INDEX: 26.38 KG/M2 | WEIGHT: 168.4 LBS | TEMPERATURE: 97.9 F

## 2025-07-15 DIAGNOSIS — D48.5 NEOPLASM OF UNCERTAIN BEHAVIOR OF SKIN: Primary | ICD-10-CM

## 2025-07-15 DIAGNOSIS — D22.9 ATYPICAL MOLE: ICD-10-CM

## 2025-07-15 PROCEDURE — 88342 IMHCHEM/IMCYTCHM 1ST ANTB: CPT | Performed by: STUDENT IN AN ORGANIZED HEALTH CARE EDUCATION/TRAINING PROGRAM

## 2025-07-15 PROCEDURE — 88305 TISSUE EXAM BY PATHOLOGIST: CPT | Performed by: STUDENT IN AN ORGANIZED HEALTH CARE EDUCATION/TRAINING PROGRAM

## 2025-07-15 PROCEDURE — 99204 OFFICE O/P NEW MOD 45 MIN: CPT | Performed by: REGISTERED NURSE

## 2025-07-15 PROCEDURE — 11102 TANGNTL BX SKIN SINGLE LES: CPT | Performed by: REGISTERED NURSE

## 2025-08-18 ENCOUNTER — OFFICE VISIT (OUTPATIENT)
Dept: FAMILY MEDICINE CLINIC | Facility: CLINIC | Age: 65
End: 2025-08-18
Payer: MEDICARE

## 2025-08-18 VITALS
OXYGEN SATURATION: 98 % | DIASTOLIC BLOOD PRESSURE: 82 MMHG | BODY MASS INDEX: 26.06 KG/M2 | HEIGHT: 67 IN | HEART RATE: 47 BPM | SYSTOLIC BLOOD PRESSURE: 122 MMHG | TEMPERATURE: 97.8 F | WEIGHT: 166 LBS | RESPIRATION RATE: 16 BRPM

## 2025-08-18 DIAGNOSIS — Z12.31 ENCOUNTER FOR SCREENING MAMMOGRAM FOR BREAST CANCER: ICD-10-CM

## 2025-08-18 DIAGNOSIS — T63.441D ANAPHYLACTIC REACTION TO BEE STING, ACCIDENTAL OR UNINTENTIONAL, SUBSEQUENT ENCOUNTER: ICD-10-CM

## 2025-08-18 DIAGNOSIS — E03.9 HYPOTHYROIDISM, UNSPECIFIED TYPE: ICD-10-CM

## 2025-08-18 DIAGNOSIS — Z78.0 POST-MENOPAUSAL: ICD-10-CM

## 2025-08-18 DIAGNOSIS — Z00.00 MEDICARE ANNUAL WELLNESS VISIT, SUBSEQUENT: Primary | ICD-10-CM

## 2025-08-18 DIAGNOSIS — N39.3 FEMALE STRESS INCONTINENCE: ICD-10-CM

## 2025-08-18 DIAGNOSIS — T78.2XXD ANAPHYLACTIC REACTION TO BEE STING, ACCIDENTAL OR UNINTENTIONAL, SUBSEQUENT ENCOUNTER: ICD-10-CM

## 2025-08-18 DIAGNOSIS — Z12.83 SKIN CANCER SCREENING: ICD-10-CM

## 2025-08-18 DIAGNOSIS — R53.83 FATIGUE, UNSPECIFIED TYPE: ICD-10-CM

## 2025-08-18 DIAGNOSIS — Z87.891 FORMER SMOKER: ICD-10-CM

## 2025-08-18 DIAGNOSIS — E78.1 ESSENTIAL HYPERTRIGLYCERIDEMIA: ICD-10-CM

## 2025-08-18 PROCEDURE — 99214 OFFICE O/P EST MOD 30 MIN: CPT | Performed by: PHYSICIAN ASSISTANT

## 2025-08-18 PROCEDURE — G2211 COMPLEX E/M VISIT ADD ON: HCPCS | Performed by: PHYSICIAN ASSISTANT

## 2025-08-18 PROCEDURE — G0439 PPPS, SUBSEQ VISIT: HCPCS | Performed by: PHYSICIAN ASSISTANT

## 2025-08-18 RX ORDER — LEVOTHYROXINE SODIUM 88 UG/1
88 TABLET ORAL DAILY
Qty: 100 TABLET | Refills: 3 | Status: SHIPPED | OUTPATIENT
Start: 2025-08-18

## 2025-08-18 RX ORDER — FENOFIBRATE 145 MG/1
145 TABLET, FILM COATED ORAL DAILY
Qty: 100 TABLET | Refills: 3 | Status: SHIPPED | OUTPATIENT
Start: 2025-08-18

## 2025-08-18 RX ORDER — EPINEPHRINE 0.3 MG/.3ML
0.3 INJECTION SUBCUTANEOUS ONCE
Qty: 0.6 ML | Refills: 3 | Status: SHIPPED | OUTPATIENT
Start: 2025-08-18 | End: 2025-08-18